# Patient Record
Sex: FEMALE | HISPANIC OR LATINO | Employment: FULL TIME | URBAN - METROPOLITAN AREA
[De-identification: names, ages, dates, MRNs, and addresses within clinical notes are randomized per-mention and may not be internally consistent; named-entity substitution may affect disease eponyms.]

---

## 2017-04-21 ENCOUNTER — OFFICE VISIT (OUTPATIENT)
Dept: MEDICAL GROUP | Facility: PHYSICIAN GROUP | Age: 27
End: 2017-04-21
Payer: COMMERCIAL

## 2017-04-21 ENCOUNTER — HOSPITAL ENCOUNTER (OUTPATIENT)
Facility: MEDICAL CENTER | Age: 27
End: 2017-04-21
Attending: NURSE PRACTITIONER
Payer: COMMERCIAL

## 2017-04-21 VITALS
TEMPERATURE: 98.6 F | RESPIRATION RATE: 14 BRPM | WEIGHT: 139 LBS | BODY MASS INDEX: 23.73 KG/M2 | SYSTOLIC BLOOD PRESSURE: 106 MMHG | DIASTOLIC BLOOD PRESSURE: 64 MMHG | HEART RATE: 70 BPM | OXYGEN SATURATION: 100 % | HEIGHT: 64 IN

## 2017-04-21 DIAGNOSIS — N89.8 VAGINAL ODOR: ICD-10-CM

## 2017-04-21 DIAGNOSIS — Z97.5 IUD (INTRAUTERINE DEVICE) IN PLACE: ICD-10-CM

## 2017-04-21 DIAGNOSIS — A74.9 CHLAMYDIA INFECTION: ICD-10-CM

## 2017-04-21 PROCEDURE — 87491 CHLMYD TRACH DNA AMP PROBE: CPT

## 2017-04-21 PROCEDURE — 87660 TRICHOMONAS VAGIN DIR PROBE: CPT

## 2017-04-21 PROCEDURE — 99214 OFFICE O/P EST MOD 30 MIN: CPT | Performed by: NURSE PRACTITIONER

## 2017-04-21 PROCEDURE — 87480 CANDIDA DNA DIR PROBE: CPT

## 2017-04-21 PROCEDURE — 87510 GARDNER VAG DNA DIR PROBE: CPT

## 2017-04-21 PROCEDURE — 88175 CYTOPATH C/V AUTO FLUID REDO: CPT

## 2017-04-21 PROCEDURE — 87591 N.GONORRHOEAE DNA AMP PROB: CPT

## 2017-04-21 RX ORDER — METRONIDAZOLE 500 MG/1
500 TABLET ORAL 3 TIMES DAILY
Qty: 30 TAB | Refills: 0 | Status: SHIPPED | OUTPATIENT
Start: 2017-04-21 | End: 2019-06-18

## 2017-04-21 ASSESSMENT — PATIENT HEALTH QUESTIONNAIRE - PHQ9: CLINICAL INTERPRETATION OF PHQ2 SCORE: 0

## 2017-04-21 NOTE — ASSESSMENT & PLAN NOTE
Patient states that she had a ParaGard IUD placed by Dr. Olmstead 3 years ago. She has an 8-year-old and a 3-year-old daughter. She is in a monogamous relationship at this time. States that she checks her strings every month or so. Denies abdominal pain,

## 2017-04-21 NOTE — ASSESSMENT & PLAN NOTE
Patient states that she has ongoing vaginal odor, especially after intercourse. She denies itching pain lesions fever or chills. Patient states that she had BV one year ago that was treated with Flagyl however she states that symptoms have never felt like they completely went away. She is due for a Pap smear.

## 2017-04-21 NOTE — PROGRESS NOTES
Chief Complaint   Patient presents with   • Establish Care   • Other     vaginal odor        Subjective:   Josefina REGAN is a 27 y.o. female here today to establish care for evaluation and management of:    IUD (intrauterine device) in place- paragard- 10/6/2014  Patient states that she had a ParaGard IUD placed by Dr. Olmstead 3 years ago. She has an 8-year-old and a 3-year-old daughter. She is in a monogamous relationship at this time. States that she checks her strings every month or so. Denies abdominal pain,    Vaginal odor  Patient states that she has ongoing vaginal odor, especially after intercourse. She denies itching pain lesions fever or chills. Patient states that she had BV one year ago that was treated with Flagyl however she states that symptoms have never felt like they completely went away. She is due for a Pap smear.       Current medicines (including changes today)  Current Outpatient Prescriptions   Medication Sig Dispense Refill   • metronidazole (FLAGYL) 500 MG Tab Take 1 Tab by mouth 3 times a day. 30 Tab 0   • metronidazole (FLAGYL) 500 MG Tab Take 1 Tab by mouth every 12 hours. (Patient not taking: Reported on 6/21/2016) 14 Tab 0   • metronidazole (FLAGYL) 500 MG TABS Take 1 Tab by mouth 3 times a day. (Patient not taking: Reported on 6/21/2016) 30 Tab 0   • fluconazole (DIFLUCAN) 100 MG TABS Take 1.5 Tabs by mouth every day. (Patient not taking: Reported on 6/21/2016) 1 Tab 0   • norgestimate-ethinyl estradiol (ORTHO-CYCLEN, 28,) 0.25-35 MG-MCG per tablet Take 1 Tab by mouth every day. (Patient not taking: Reported on 6/21/2016) 28 Tab 2   • Norethin Ace-Eth Estrad-FE (MINASTRIN 24 FE) 1-20 MG-MCG(24) CHEW Take 1 Tab by mouth every day. (Patient not taking: Reported on 6/21/2016) 28 Tab 11   • Prenatal Vit-Fe Fumarate-FA (PRENATAL VITAMINS) 28-0.8 MG TABS Take 1 Tab by mouth every day. (Patient not taking: Reported on 6/21/2016) 90 Tab 3   • norethindrone (MICRONOR) 0.35 MG  "tablet Take 1 Tab by mouth every day. (Patient not taking: Reported on 6/21/2016) 28 Tab 11   • ibuprofen (MOTRIN) 800 MG TABS Take 1 Tab by mouth every 8 hours as needed (Cramping). (Patient not taking: Reported on 6/21/2016) 60 Tab 1   • oxycodone-acetaminophen (PERCOCET) 5-325 MG TABS Take 1 Tab by mouth every four hours as needed ((Pain Scale 4-6); If acetaminophen ineffective). (Patient not taking: Reported on 6/21/2016) 15 Tab 0   • Prenat w/o A-FeCbGl-DSS-FA-DHA (CITRANATAL 90 DHA) 90-1 & 300 MG MISC Take 1 mg by mouth every day. GENERIC OKAY (Patient not taking: Reported on 6/21/2016) 100 Each 2     No current facility-administered medications for this visit.     She  has a past medical history of NEGATIVE HISTORY OF and Urinary tract infection, site not specified (2008).    ROS as stated in history of present illness.  No chest pain, no shortness of breath, no abdominal pain       Objective:     Blood pressure 106/64, pulse 70, temperature 37 °C (98.6 °F), resp. rate 14, height 1.626 m (5' 4\"), weight 63.05 kg (139 lb), last menstrual period 04/01/2017, SpO2 100 %, not currently breastfeeding. Body mass index is 23.85 kg/(m^2). stable.  Physical Exam:  Constitutional: Alert, no distress.  Skin: Warm, dry, good turgor,no cyanosis, no rashes in visible areas.  Eye: Equal, round and reactive, conjunctiva clear, lids normal.  Ears: No tenderness, no discharge.  External canals are without any significant edema or erythema..  Gross auditory acuity is intact.  Nose: symmetrical without tenderness, no discharge.  Mouth/Throat: lips without lesion.  Oropharynx clear.  .  Neck: Trachea midline, no masses, no obvious thyroid enlargement.. No cervical or supraclavicular lymphadenopathy. Range of motion within normal limits.  Neuro: Cranial nerves 2-12 grossly intact.  No sensory deficit.  Respiratory: Unlabored respiratory effort, lungs clear to auscultation, no wheezes, no ronchi.  Cardiovascular: Normal S1, S2, no " murmur, no edema.  Abdomen: Soft, non-tender, no masses, no guarding,  no hepatosplenomegaly.  GYN: Labia without lesions or swelling. Internal vaginal vault without lesions or irritation noted. White discharge without odor noted. Cervix without lesions or tenderness. No cervical motion tenderness. No uterine or ovarian pain with bimanual exam.   Psych: Alert and oriented x3, normal affect and mood and judgement.        Assessment and Plan:   The following treatment plan was discussed          1. Vaginal odor  This is a new problem to me. Chronic. Ongoing. Vaginal exam performed with swabs obtained including overdue Pap smear. We'll monitor and follow results.    - VAGINAL PATHOGENS DNA PANEL; Future  - CHLAMYDIA/GC PCR URINE OR SWAB; Future  - THINPREP RFLX HPV ASCUS W/CTNG; Future    3. IUD (intrauterine device) in place- paragard- 10/6/2014  IUD strings noted on vaginal exam.. Discussed with patient the need for Pap smears every 2-3 years if they are normal. Discussed any questions that patient had.      Followup: Return in about 1 year (around 4/21/2018), or if symptoms worsen or fail to improve, for Annual.

## 2017-04-21 NOTE — MR AVS SNAPSHOT
"        Josefina Brittany REGAN   2017 9:00 AM   Office Visit   MRN: [id number]    Department:  [facility]   Dept Phone:  [phone]    Description:  Female : 1990   Provider:  ZAKI Beavers           Reason for Visit     Establish Care     Other vaginal odor       Allergies as of 2017     Allergen Noted Reactions    Sulfa Drugs 2017         You were diagnosed with     Chlamydia infection   [661635]       Vaginal odor   [472744]       IUD (intrauterine device) in place   [518899]         Vital Signs     Blood Pressure Pulse Temperature Respirations Height Weight    106/64 mmHg 70 37 °C (98.6 °F) 14 1.626 m (5' 4\") 63.05 kg (139 lb)    Body Mass Index Oxygen Saturation Last Menstrual Period Breastfeeding? Smoking Status       23.85 kg/m2 100% 2017 No Never Smoker        Basic Information     Date Of Birth Sex Race Ethnicity Preferred Language    1990 Female  or   Origin (Bel[place]ian,Irish,Rwandan,Soy, etc) English      Problem List              ICD-10-CM Priority Class Noted - Resolved    IUD (intrauterine device) in place- paragard- 10/6/2014 Z97.5   10/28/2014 - Present    Bacterial vaginosis N76.0, B96.89   2015 - Present    Vaginal odor N89.8   2017 - Present      Health Maintenance        Date Due Completion Dates    PAP SMEAR 2019, 2014, 2013, 2011    IMM DTaP/Tdap/Td Vaccine (2 - Td) 2024            Current Immunizations     HPV Quadrivalent Vaccine (GARDASIL) 2012    Hepatitis B Vaccine Recombivax (Adol/Adult) 2016    Influenza Vaccine Quad Inj (Preserved) 10/31/2016    Tdap Vaccine 2014  2:30 PM      Below and/or attached are the medications your provider expects you to take. Review all of your home medications and newly ordered medications with your provider and/or pharmacist. Follow medication instructions as directed by your provider and/or pharmacist. " Please keep your medication list with you and share with your provider. Update the information when medications are discontinued, doses are changed, or new medications (including over-the-counter products) are added; and carry medication information at all times in the event of emergency situations     Allergies:  SULFA DRUGS - (reactions not documented)               Medications  Valid as of: April 21, 2017 -  9:42 AM    Generic Name Brand Name Tablet Size Instructions for use    Fluconazole (Tab) DIFLUCAN 100 MG Take 1.5 Tabs by mouth every day.        Ibuprofen (Tab) MOTRIN 800 MG Take 1 Tab by mouth every 8 hours as needed (Cramping).        MetroNIDAZOLE (Tab) FLAGYL 500 MG Take 1 Tab by mouth 3 times a day.        MetroNIDAZOLE (Tab) FLAGYL 500 MG Take 1 Tab by mouth every 12 hours.        MetroNIDAZOLE (Tab) FLAGYL 500 MG Take 1 Tab by mouth 3 times a day.        Norethin Ace-Eth Estrad-FE (Chew Tab) Norethin Ace-Eth Estrad-FE 1-20 MG-MCG(24) Take 1 Tab by mouth every day.        Norethindrone (Tab) MICRONOR 0.35 MG Take 1 Tab by mouth every day.        Norgestimate-Eth Estradiol (Tab) ORTHO-CYCLEN 0.25-35 MG-MCG Take 1 Tab by mouth every day.        Oxycodone-Acetaminophen (Tab) PERCOCET 5-325 MG Take 1 Tab by mouth every four hours as needed ((Pain Scale 4-6); If acetaminophen ineffective).        Prenat w/o A-FeCbGl-DSS-FA-DHA (Misc) CITRANATAL 90 DHA 90-1 & 300 MG Take 1 mg by mouth every day. GENERIC OKAY        Prenatal Vit-Fe Fumarate-FA (Tab) Prenatal Vitamins 28-0.8 MG Take 1 Tab by mouth every day.        .                 Medicines prescribed today were sent to:     [facility] [address] [place] [address] - [id number] [facility] [address]    Phone: [phone] Fax: [phone]    Open 24 Hours?: No      Medication refill instructions:       If your prescription bottle indicates you have medication refills left, it is not necessary to call your provider’s office.  Please contact your pharmacy and they will refill your medication.    If your prescription bottle indicates you do not have any refills left, you may request refills at any time through one of the following ways: The online [employer] system (except Urgent Care), by calling your provider’s office, or by asking your pharmacy to contact your provider’s office with a refill request. Medication refills are processed only during regular business hours and may not be available until the next business day. Your provider may request additional information or to have a follow-up visit with you prior to refilling your medication.   *Please Note: Medication refills are assigned a new Rx number when refilled electronically. Your pharmacy may indicate that no refills were authorized even though a new prescription for the same medication is available at the pharmacy. Please request the medicine by name with the pharmacy before contacting your provider for a refill.        Your To Do List     Future Labs/Procedures Complete By Expires    CHLAMYDIA/GC PCR URINE OR SWAB  As directed 4/22/2018    THINPREP RFLX HPV ASCUS W/CTNG  As directed 4/22/2018    VAGINAL PATHOGENS DNA PANEL  As directed 4/22/2018         [employer] Access Code: Activation code not generated  Current [employer] Status: Active

## 2017-04-22 LAB
C TRACH DNA GENITAL QL NAA+PROBE: NEGATIVE
C TRACH DNA SPEC QL NAA+PROBE: NEGATIVE
CANDIDA DNA VAG QL PROBE+SIG AMP: NEGATIVE
CYTOLOGY REG CYTOL: NORMAL
G VAGINALIS DNA VAG QL PROBE+SIG AMP: POSITIVE
N GONORRHOEA DNA GENITAL QL NAA+PROBE: NEGATIVE
N GONORRHOEA DNA SPEC QL NAA+PROBE: NEGATIVE
SPECIMEN SOURCE: NORMAL
SPECIMEN SOURCE: NORMAL
T VAGINALIS DNA VAG QL PROBE+SIG AMP: NEGATIVE

## 2017-04-24 RX ORDER — METRONIDAZOLE 500 MG/1
500 TABLET ORAL 2 TIMES DAILY
Qty: 14 TAB | Refills: 0 | Status: SHIPPED | OUTPATIENT
Start: 2017-04-24 | End: 2019-06-18

## 2017-04-25 ENCOUNTER — PATIENT MESSAGE (OUTPATIENT)
Dept: MEDICAL GROUP | Facility: PHYSICIAN GROUP | Age: 27
End: 2017-04-25

## 2019-05-09 ENCOUNTER — TELEPHONE (OUTPATIENT)
Dept: SCHEDULING | Facility: IMAGING CENTER | Age: 29
End: 2019-05-09

## 2019-06-18 ENCOUNTER — OFFICE VISIT (OUTPATIENT)
Dept: INTERNAL MEDICINE | Facility: MEDICAL CENTER | Age: 29
End: 2019-06-18
Payer: MEDICAID

## 2019-06-18 VITALS
BODY MASS INDEX: 25.33 KG/M2 | OXYGEN SATURATION: 96 % | TEMPERATURE: 99.4 F | WEIGHT: 152 LBS | SYSTOLIC BLOOD PRESSURE: 110 MMHG | HEIGHT: 65 IN | HEART RATE: 82 BPM | DIASTOLIC BLOOD PRESSURE: 64 MMHG

## 2019-06-18 DIAGNOSIS — Z76.89 ESTABLISHING CARE WITH NEW DOCTOR, ENCOUNTER FOR: ICD-10-CM

## 2019-06-18 DIAGNOSIS — Z11.4 SCREENING FOR HIV (HUMAN IMMUNODEFICIENCY VIRUS): ICD-10-CM

## 2019-06-18 DIAGNOSIS — Z00.00 ANNUAL PHYSICAL EXAM: ICD-10-CM

## 2019-06-18 DIAGNOSIS — E66.3 OVERWEIGHT (BMI 25.0-29.9): ICD-10-CM

## 2019-06-18 PROCEDURE — G0438 PPPS, INITIAL VISIT: HCPCS | Mod: GE | Performed by: INTERNAL MEDICINE

## 2019-06-18 NOTE — PROGRESS NOTES
New Patient to Establish    Reason to establish: New patient to establish    CC:   -Annual physical exam    HPI: Ms. Noriega is a 29 years old lady who presents to the clinic for the previously mentioned reason.  Past medical history remarkable for overweight.    Depression Screening    Little interest or pleasure in doing things?   No  Feeling down, depressed , or hopeless?  No     Trouble falling or staying asleep, or sleeping too much?   No  Feeling tired or having little energy?   No  Poor appetite or overeating?   No  Feeling bad about yourself - or that you are a failure or have let yourself or your family down?  No  Trouble concentrating on things, such as reading the newspaper or watching television?  No  Moving or speaking so slowly that other people could have noticed.  Or the opposite - being so fidgety or restless that you have been moving around a lot more than usual?   No  Thoughts that you would be better off dead, or of hurting yourself?   No  Patient Health Questionnaire Score:  0    If depressive symptoms identified deferred to follow up visit unless specifically addressed in assessment and plan.    Interpretation of PHQ-9 Total Score   Score Severity   1-4 No Depression   5-9 Mild Depression   10-14 Moderate Depression   15-19 Moderately Severe Depression   20-27 Severe Depression      Screening for Cognitive Impairment    Three Minute Recall (village, kitchen, baby)  3/3    Chase clock face with all 12 numbers and set the hands to show 10 past 10.       Cognitive concerns identified deferred for follow up unless specifically addressed in assessment and plan.    Fall Risk Assessment    Has the patient had two or more falls in the last year or any fall with injury in the last year?   No    Safety Assessment    Throw rugs on floor.   Yes  Handrails on all stairs.   No  Good lighting in all hallways.   Yes  Difficulty hearing.   No  Patient counseled about all safety risks that were  identified.    Functional Assessment ADLs    Are there any barriers preventing you from cooking for yourself or meeting nutritional needs?   .  no  Are there any barriers preventing you from driving safely or obtaining transportation?   .  no  Are there any barriers preventing you from using a telephone or calling for help?   .  no  Are there any barriers preventing you from shopping?   .  no  Are there any barriers preventing you from taking care of your own finances?   .  no  Are there any barriers preventing you from managing your medications?   .  no  Are there any barriers preventing you from showering, bathing or dressing yourself?  .  no  Are you currently engaging in any exercise or physical activity?   .   no but has plan to initiate physical exercise and diet moidification  What is your perception of your health?   .Very good      Health Maintenance Summary                IMM INFLUENZA Next Due 9/1/2019      Done 10/31/2016 Imm Admin: Influenza Vaccine Quad Inj (Preserved)    PAP SMEAR Next Due 4/21/2020      Done 4/21/2017 PATHOLOGY GYN SPECIMEN     Patient has more history with this topic...    IMM DTaP/Tdap/Td Vaccine Next Due 2/25/2024      Done 2/25/2014 Imm Admin: Tdap Vaccine          Patient Care Team:  Kameron Chiang M.D. as PCP - General (Internal Medicine)      Patient Active Problem List    Diagnosis Date Noted   • Overweight (BMI 25.0-29.9) 06/18/2019   • History of Bacterial vaginosis 06/16/2015   • IUD (intrauterine device) in place- paragard- 10/6/2014 10/28/2014       Past Medical History:   Diagnosis Date   • NEGATIVE HISTORY OF    • Urinary tract infection, site not specified 2008       No current outpatient prescriptions on file.     No current facility-administered medications for this visit.        Allergies as of 06/18/2019 - Reviewed 06/18/2019   Allergen Reaction Noted   • Sulfa drugs  04/21/2017       Social History     Social History   • Marital status: Single     Spouse  "name: N/A   • Number of children: N/A   • Years of education: N/A     Occupational History   • Not on file.     Social History Main Topics   • Smoking status: Never Smoker   • Smokeless tobacco: Never Used   • Alcohol use No   • Drug use: No   • Sexual activity: No     Other Topics Concern   • Not on file     Social History Narrative   • No narrative on file       Family History   Problem Relation Age of Onset   • Diabetes Mother    • Hypertension Mother    • No Known Problems Father        Past Surgical History:   Procedure Laterality Date   • MAMMOPLASTY AUGMENTATION     • SINUSOTOMIES         ROS: As per HPI. Additional pertinent systems as noted below.  Constitutional: Negative for chills and fever.   HENT: Negative for ear pain and sore throat.    Eyes: Negative for discharge and redness.   Respiratory: Negative for cough, hemoptysis, wheezing and stridor.    Cardiovascular: Negative for chest pain, palpitations and leg swelling.   Gastrointestinal: Negative for abdominal pain, constipation, diarrhea, heartburn, nausea and vomiting.   Genitourinary: Negative for dysuria, flank pain and hematuria.   Musculoskeletal: Negative for falls and myalgias.   Skin: Negative for itching and rash.   Neurological: Negative for dizziness, seizures, loss of consciousness and headaches.   Endo/Heme/Allergies: Negative for polydipsia. Does not bruise/bleed easily.   Psychiatric/Behavioral: Negative for substance abuse and suicidal ideas.       /64 (BP Location: Right arm, Patient Position: Sitting, BP Cuff Size: Adult)   Pulse 82   Temp 37.4 °C (99.4 °F) (Temporal)   Ht 1.638 m (5' 4.5\")   Wt 68.9 kg (152 lb)   SpO2 96%   BMI 25.69 kg/m²     Physical Exam  General:  Alert and oriented, No apparent distress.    Eyes: Pupils equal and reactive. No scleral icterus.    Throat: Clear no erythema or exudates noted.    Neck: Supple. No lymphadenopathy noted. Thyroid not enlarged.    Lungs: Clear to auscultation and " percussion bilaterally.    Cardiovascular: Regular rate and rhythm. No murmurs, rubs or gallops.    Abdomen:  Benign. No rebound or guarding noted.    Extremities: No clubbing, cyanosis, edema.    Skin: Clear. No rash or suspicious skin lesions noted.    Other:     Note: I have reviewed all pertinent labs and diagnostic tests associated with this visit with specific comments listed under the assessment and plan below    Assessment and Plan    1. Annual physical exam  -Patient denies having any complaints today  -Patient consider herself healthy  -Negative depression screening, PHQ 2 0  -Normal physical examination    2. Establishing care with new doctor, encounter for  -Patient has no primary care physician and would like to establish care with us    3.  Screening for HIV (human immunodeficiency virus)  -Patient currently not sexually active, last sexual encounter was more than a year ago  -Patient currently has IUD, patient had her IUD for 4 years  -Last Pap smear was 4/2017, was negative for HPV, she was treated for bacterial vaginosis at that time with metronidazole 500 mg twice daily for 7 days, patient was tested positive for chlamydia at that time and she was treated appropriately by her gynecologist (with her partner)  Plan  -HIV AG/AB COMBO ASSAY screening  -No need for Pap smear as her last Pap smear was 2017      Followup: Return in about 1 year (around 6/18/2020).    Risk Assessment (discuss potential complications a function of chronic problems): Risk assessment has been discussed with the patient    Complexity (discuss number of co-morbidities): Comorbidities have been discussed with the patient    Signed by: Kameron Chiang M.D.

## 2019-08-19 ENCOUNTER — OFFICE VISIT (OUTPATIENT)
Dept: URGENT CARE | Facility: PHYSICIAN GROUP | Age: 29
End: 2019-08-19

## 2019-08-19 ENCOUNTER — HOSPITAL ENCOUNTER (OUTPATIENT)
Facility: MEDICAL CENTER | Age: 29
End: 2019-08-19
Attending: FAMILY MEDICINE

## 2019-08-19 VITALS
HEIGHT: 64 IN | DIASTOLIC BLOOD PRESSURE: 60 MMHG | HEART RATE: 82 BPM | OXYGEN SATURATION: 99 % | TEMPERATURE: 98.8 F | BODY MASS INDEX: 23.9 KG/M2 | SYSTOLIC BLOOD PRESSURE: 110 MMHG | WEIGHT: 140 LBS

## 2019-08-19 DIAGNOSIS — N89.8 VAGINAL DISCHARGE: ICD-10-CM

## 2019-08-19 DIAGNOSIS — Z71.1 CONCERN ABOUT STD IN FEMALE WITHOUT DIAGNOSIS: ICD-10-CM

## 2019-08-19 LAB
APPEARANCE UR: CLEAR
BILIRUB UR STRIP-MCNC: NORMAL MG/DL
COLOR UR AUTO: NORMAL
GLUCOSE UR STRIP.AUTO-MCNC: NORMAL MG/DL
KETONES UR STRIP.AUTO-MCNC: 40 MG/DL
LEUKOCYTE ESTERASE UR QL STRIP.AUTO: NORMAL
NITRITE UR QL STRIP.AUTO: NORMAL
PH UR STRIP.AUTO: 6.5 [PH] (ref 5–8)
PROT UR QL STRIP: NORMAL MG/DL
RBC UR QL AUTO: NORMAL
SP GR UR STRIP.AUTO: 1.02
UROBILINOGEN UR STRIP-MCNC: 0.2 MG/DL

## 2019-08-19 PROCEDURE — 87660 TRICHOMONAS VAGIN DIR PROBE: CPT

## 2019-08-19 PROCEDURE — 87510 GARDNER VAG DNA DIR PROBE: CPT

## 2019-08-19 PROCEDURE — 99214 OFFICE O/P EST MOD 30 MIN: CPT | Performed by: FAMILY MEDICINE

## 2019-08-19 PROCEDURE — 87591 N.GONORRHOEAE DNA AMP PROB: CPT

## 2019-08-19 PROCEDURE — 87491 CHLMYD TRACH DNA AMP PROBE: CPT

## 2019-08-19 PROCEDURE — 81002 URINALYSIS NONAUTO W/O SCOPE: CPT | Performed by: FAMILY MEDICINE

## 2019-08-19 PROCEDURE — 87480 CANDIDA DNA DIR PROBE: CPT

## 2019-08-19 RX ORDER — M-VIT,TX,IRON,MINS/CALC/FOLIC 27MG-0.4MG
1 TABLET ORAL DAILY
COMMUNITY

## 2019-08-19 ASSESSMENT — ENCOUNTER SYMPTOMS
SHORTNESS OF BREATH: 0
VOMITING: 0
FEVER: 0

## 2019-08-19 NOTE — PROGRESS NOTES
"Subjective:     Josefina REGAN is a 29 y.o. female who presents for Vaginitis (x3days odor)    HPI  Pt presents for evaluation of a new problem   Vaginal odor for the past 3 days   Constant and stable, not worsening   Having watery discharge   No associated vaginal bleeding   Has not tried any treatments so far   Currently sexually active without condoms   Unsure if she has STD exposure, but would like testing   Has an IUD in place   LMP 8 days ago     Review of Systems   Constitutional: Negative for fever.   Respiratory: Negative for shortness of breath.    Cardiovascular: Negative for chest pain.   Gastrointestinal: Negative for vomiting.   Genitourinary: Negative for dysuria.   Skin: Negative for rash.     PMH:  has a past medical history of NEGATIVE HISTORY OF and Urinary tract infection, site not specified (2008).  MEDS:   Current Outpatient Medications:   •  therapeutic multivitamin-minerals (THERAGRAN-M) Tab, Take 1 Tab by mouth every day., Disp: , Rfl:   ALLERGIES:   Allergies   Allergen Reactions   • Sulfa Drugs      SURGHX:   Past Surgical History:   Procedure Laterality Date   • MAMMOPLASTY AUGMENTATION     • SINUSOTOMIES       SOCHX:  reports that she has never smoked. She has never used smokeless tobacco. She reports that she does not drink alcohol or use drugs.  FH: Family history was reviewed, not contributing to acute illness     Objective:   /60   Pulse 82   Temp 37.1 °C (98.8 °F) (Temporal)   Ht 1.626 m (5' 4\")   Wt 63.5 kg (140 lb)   SpO2 99%   BMI 24.03 kg/m²     Physical Exam   Constitutional: She is oriented to person, place, and time. She appears well-developed and well-nourished. No distress.   HENT:   Head: Normocephalic and atraumatic.   Abdominal: Soft. Bowel sounds are normal. She exhibits no distension and no mass. There is no rebound and no guarding.   +Mild suprapubic tenderness  No CVA tenderness    Neurological: She is alert and oriented to person, place, and " time. No sensory deficit.   Skin: Skin is warm and dry. She is not diaphoretic. No erythema.   Psychiatric: She has a normal mood and affect. Her behavior is normal. Judgment and thought content normal.       Assessment/Plan:   Assessment    1. Vaginal discharge  2. Concern about STD in female without diagnosis   Point-of-care urine within normal limits.  Will send vaginal path as well as STD panel.  We will follow-up with lab results.  - POCT Urinalysis  - VAGINAL PATHOGENS DNA PANEL; Future  - CHLAMYDIA/GC PCR URINE OR SWAB; Future  - HIV AG/AB COMBO ASSAY SCREENING; Future  - RPR (SYPHILIS); Future

## 2019-08-20 LAB
C TRACH DNA SPEC QL NAA+PROBE: NEGATIVE
CANDIDA DNA VAG QL PROBE+SIG AMP: NEGATIVE
G VAGINALIS DNA VAG QL PROBE+SIG AMP: POSITIVE
N GONORRHOEA DNA SPEC QL NAA+PROBE: NEGATIVE
SPECIMEN SOURCE: NORMAL
T VAGINALIS DNA VAG QL PROBE+SIG AMP: NEGATIVE

## 2019-08-21 ENCOUNTER — TELEPHONE (OUTPATIENT)
Dept: URGENT CARE | Facility: PHYSICIAN GROUP | Age: 29
End: 2019-08-21

## 2019-08-21 ENCOUNTER — PATIENT MESSAGE (OUTPATIENT)
Dept: MEDICAL GROUP | Facility: CLINIC | Age: 29
End: 2019-08-21

## 2019-08-21 DIAGNOSIS — N76.0 BACTERIAL VAGINOSIS: ICD-10-CM

## 2019-08-21 DIAGNOSIS — B96.89 BACTERIAL VAGINOSIS: ICD-10-CM

## 2019-08-21 RX ORDER — CLINDAMYCIN HYDROCHLORIDE 300 MG/1
300 CAPSULE ORAL 2 TIMES DAILY
Qty: 14 CAP | Refills: 0 | Status: SHIPPED | OUTPATIENT
Start: 2019-08-21 | End: 2019-08-28

## 2019-08-21 NOTE — TELEPHONE ENCOUNTER
Pt called stating she was expecting an abx for treatment/. [facility]    She can be reached at  [phone]

## 2020-10-08 ENCOUNTER — TELEPHONE (OUTPATIENT)
Dept: SCHEDULING | Facility: IMAGING CENTER | Age: 30
End: 2020-10-08

## 2020-10-15 ENCOUNTER — OFFICE VISIT (OUTPATIENT)
Dept: MEDICAL GROUP | Facility: MEDICAL CENTER | Age: 30
End: 2020-10-15
Payer: COMMERCIAL

## 2020-10-15 VITALS
WEIGHT: 158 LBS | HEART RATE: 81 BPM | RESPIRATION RATE: 16 BRPM | BODY MASS INDEX: 26.98 KG/M2 | DIASTOLIC BLOOD PRESSURE: 64 MMHG | OXYGEN SATURATION: 98 % | SYSTOLIC BLOOD PRESSURE: 110 MMHG | HEIGHT: 64 IN

## 2020-10-15 DIAGNOSIS — Z00.00 ROUTINE GENERAL MEDICAL EXAMINATION AT A HEALTH CARE FACILITY: ICD-10-CM

## 2020-10-15 DIAGNOSIS — L24.9 IRRITANT CONTACT DERMATITIS, UNSPECIFIED TRIGGER: ICD-10-CM

## 2020-10-15 PROBLEM — E66.3 OVERWEIGHT (BMI 25.0-29.9): Status: RESOLVED | Noted: 2019-06-18 | Resolved: 2020-10-15

## 2020-10-15 PROCEDURE — 99203 OFFICE O/P NEW LOW 30 MIN: CPT | Performed by: NURSE PRACTITIONER

## 2020-10-15 RX ORDER — TRIAMCINOLONE ACETONIDE 1 MG/G
1 CREAM TOPICAL 2 TIMES DAILY
Qty: 30 G | Refills: 1 | Status: SHIPPED | OUTPATIENT
Start: 2020-10-15 | End: 2022-05-19

## 2020-10-15 ASSESSMENT — PATIENT HEALTH QUESTIONNAIRE - PHQ9: CLINICAL INTERPRETATION OF PHQ2 SCORE: 0

## 2020-10-15 NOTE — PROGRESS NOTES
Subjective:      Josefina REGAN is a 30 y.o. female who presents with Establish Care        CC: Patient is here today to establish care as well as irritation of the skin in the right axilla area    HPI       1. Irritant contact dermatitis, unspecified trigger  Patient reports that about 2 months ago she irritated the skin in her right axilla area and started using over-the-counter products to improve it but despite this it has not changed in the last month.  She states there is some redness and pruritus.  There has been no lymph node enlargement or open areas of the skin.  She does continue to shave the areas.  She does not have anything similar on the left side.    2. Routine general medical examination at a health care facility  Patient states she feels generally healthy and admits she is due for 3-year follow-up Pap smear and has not done any routine blood work.  Past Medical History:   Diagnosis Date   • NEGATIVE HISTORY OF    • Urinary tract infection, site not specified 2008     Social History     Socioeconomic History   • Marital status: Single     Spouse name: Not on file   • Number of children: Not on file   • Years of education: Not on file   • Highest education level: Not on file   Occupational History   • Not on file   Social Needs   • Financial resource strain: Not on file   • Food insecurity     Worry: Not on file     Inability: Not on file   • Transportation needs     Medical: Not on file     Non-medical: Not on file   Tobacco Use   • Smoking status: Never Smoker   • Smokeless tobacco: Never Used   Substance and Sexual Activity   • Alcohol use: Yes     Comment: rare   • Drug use: No   • Sexual activity: Yes     Partners: Male     Birth control/protection: I.U.D.   Lifestyle   • Physical activity     Days per week: Not on file     Minutes per session: Not on file   • Stress: Not on file   Relationships   • Social connections     Talks on phone: Not on file     Gets together: Not on file      "Attends Latter-day service: Not on file     Active member of club or organization: Not on file     Attends meetings of clubs or organizations: Not on file     Relationship status: Not on file   • Intimate partner violence     Fear of current or ex partner: Not on file     Emotionally abused: Not on file     Physically abused: Not on file     Forced sexual activity: Not on file   Other Topics Concern   • Not on file   Social History Narrative   • Not on file     Current Outpatient Medications   Medication Sig Dispense Refill   • triamcinolone acetonide (KENALOG) 0.1 % Cream Apply 1 Application to affected area(s) 2 times a day. 30 g 1   • therapeutic multivitamin-minerals (THERAGRAN-M) Tab Take 1 Tab by mouth every day.       No current facility-administered medications for this visit.      Family History   Problem Relation Age of Onset   • Diabetes Mother    • Hypertension Mother    • No Known Problems Father          Review of Systems   Skin: Positive for itching and rash.   All other systems reviewed and are negative.         Objective:     /64 (BP Location: Left arm, Patient Position: Sitting, BP Cuff Size: Adult)   Pulse 81   Resp 16   Ht 1.626 m (5' 4\")   Wt 71.7 kg (158 lb)   SpO2 98%   BMI 27.12 kg/m²      Physical Exam  Vitals signs and nursing note reviewed.   Constitutional:       General: She is not in acute distress.     Appearance: She is well-developed. She is not diaphoretic.   HENT:      Head: Normocephalic and atraumatic.      Right Ear: External ear normal.      Left Ear: External ear normal.      Nose: Nose normal.   Eyes:      General:         Right eye: No discharge.         Left eye: No discharge.   Neck:      Musculoskeletal: Normal range of motion and neck supple.      Thyroid: No thyromegaly.   Cardiovascular:      Rate and Rhythm: Normal rate and regular rhythm.      Heart sounds: Normal heart sounds. No murmur. No friction rub. No gallop.    Pulmonary:      Effort: Pulmonary " effort is normal.      Breath sounds: Normal breath sounds. No wheezing or rales.   Musculoskeletal:         General: No tenderness.   Skin:     General: Skin is warm and dry.      Findings: Rash present.      Comments: Macular, erythematous rash in the right axilla area without ulcers or nodular areas beneath the skin.   Neurological:      Mental Status: She is alert and oriented to person, place, and time.      Deep Tendon Reflexes: Reflexes normal.   Psychiatric:         Behavior: Behavior normal.         Thought Content: Thought content normal.         Judgment: Judgment normal.                 Assessment/Plan:        1. Irritant contact dermatitis, unspecified trigger  Patient has possible irritant dermatitis and I advised her to stop all over-the-counter products and she admits that tea tree oil probably made it worse.  I will have her try some triamcinolone twice a day for 1 to 2 weeks.  I told her if it does not improve with this then I would refer her to dermatology.  - triamcinolone acetonide (KENALOG) 0.1 % Cream; Apply 1 Application to affected area(s) 2 times a day.  Dispense: 30 g; Refill: 1    2. Routine general medical examination at a health care facility  Patient would like to get some routine blood work and she will follow-up if necessary pending results.  I also advised her that she should make an appointment for her 3-year Pap smear and it appears she has an appointment with  next week for this..  - Comp Metabolic Panel; Future  - Lipid Profile; Future

## 2020-10-22 ENCOUNTER — OFFICE VISIT (OUTPATIENT)
Dept: MEDICAL GROUP | Facility: MEDICAL CENTER | Age: 30
End: 2020-10-22
Payer: COMMERCIAL

## 2020-10-22 ENCOUNTER — HOSPITAL ENCOUNTER (OUTPATIENT)
Facility: MEDICAL CENTER | Age: 30
End: 2020-10-22
Attending: NURSE PRACTITIONER
Payer: COMMERCIAL

## 2020-10-22 VITALS
HEIGHT: 64 IN | WEIGHT: 159.61 LBS | HEART RATE: 77 BPM | OXYGEN SATURATION: 97 % | SYSTOLIC BLOOD PRESSURE: 106 MMHG | DIASTOLIC BLOOD PRESSURE: 60 MMHG | BODY MASS INDEX: 27.25 KG/M2 | TEMPERATURE: 98.3 F

## 2020-10-22 DIAGNOSIS — Z12.4 PAP SMEAR FOR CERVICAL CANCER SCREENING: ICD-10-CM

## 2020-10-22 DIAGNOSIS — Z97.5 PRESENCE OF INTRAUTERINE CONTRACEPTIVE DEVICE: ICD-10-CM

## 2020-10-22 DIAGNOSIS — N63.0 BREAST LUMP: ICD-10-CM

## 2020-10-22 PROCEDURE — 87624 HPV HI-RISK TYP POOLED RSLT: CPT

## 2020-10-22 PROCEDURE — 87491 CHLMYD TRACH DNA AMP PROBE: CPT

## 2020-10-22 PROCEDURE — 88175 CYTOPATH C/V AUTO FLUID REDO: CPT

## 2020-10-22 PROCEDURE — 87591 N.GONORRHOEAE DNA AMP PROB: CPT

## 2020-10-22 PROCEDURE — 99395 PREV VISIT EST AGE 18-39: CPT | Performed by: NURSE PRACTITIONER

## 2020-10-22 NOTE — PROGRESS NOTES
CC:  Pap/Well Woman Exam    History of present illness:    Josefina REGAN is 30 y.o. female presenting today for well woman exam with gynecological exam and Pap smear.   She reports her periods are .  She is currently using  IUD as birth control. Implant She is currently not exercising on a routine basis.     Last mammogram: n/a  Last Dexa scan: n/a  Last colonoscopy: n/a        Hx of Fem Touch procedure from Dr. Jackson (Plastic Surgeon)    Past Gynecological History  Patient's last menstrual period was 3 weeks ago  BC or HRT: Copper IUD  Postmenopausal?:  Denies postmenopausal bleeding?: n/a  Menarche: 10  Menses: regular  Sexually active: yes  Sexually transmitted infections: yes  Pap: denies any history of abnormal pap   Symptoms of overactive bladder: no  Symptoms of stress incontinence: no  Symptoms of bowel incontinence: no  Feeling of vaginal bulge:no  History of sexual abuse:no  Fibroids?: no  Ovarian cysts?: no          Past Medical History:   Diagnosis Date   • NEGATIVE HISTORY OF    • Urinary tract infection, site not specified        Past Surgical History:   Procedure Laterality Date   • MAMMOPLASTY AUGMENTATION     • SINUSOTOMIES         Outpatient Encounter Medications as of 10/22/2020   Medication Sig Dispense Refill   • triamcinolone acetonide (KENALOG) 0.1 % Cream Apply 1 Application to affected area(s) 2 times a day. 30 g 1   • therapeutic multivitamin-minerals (THERAGRAN-M) Tab Take 1 Tab by mouth every day.       No facility-administered encounter medications on file as of 10/22/2020.        Patient Active Problem List    Diagnosis Date Noted   • IUD (intrauterine device) in place- paragard- 10/6/2014 10/28/2014       .  Social History     Socioeconomic History   • Marital status: Single     Spouse name: Not on file   • Number of children: Not on file   • Years of education: Not on file   • Highest education level: Not on file   Occupational History   • Not on file   Social  "Needs   • Financial resource strain: Not on file   • Food insecurity     Worry: Not on file     Inability: Not on file   • Transportation needs     Medical: Not on file     Non-medical: Not on file   Tobacco Use   • Smoking status: Never Smoker   • Smokeless tobacco: Never Used   Substance and Sexual Activity   • Alcohol use: Yes     Comment: rare   • Drug use: No   • Sexual activity: Yes     Partners: Male     Birth control/protection: I.U.D.   Lifestyle   • Physical activity     Days per week: Not on file     Minutes per session: Not on file   • Stress: Not on file   Relationships   • Social connections     Talks on phone: Not on file     Gets together: Not on file     Attends Restoration service: Not on file     Active member of club or organization: Not on file     Attends meetings of clubs or organizations: Not on file     Relationship status: Not on file   • Intimate partner violence     Fear of current or ex partner: Not on file     Emotionally abused: Not on file     Physically abused: Not on file     Forced sexual activity: Not on file   Other Topics Concern   • Not on file   Social History Narrative   • Not on file       Family History   Problem Relation Age of Onset   • Diabetes Mother    • Hypertension Mother    • No Known Problems Father          ROS: Denies Weight loss, fatigue, chest pain, SOB, bowel or bladder changes.  Denies musculoskeletal, neurological, or psychiatric problems.  Denies dysuria, dyspareunia or abnormal vaginal discharge.      States in a safe relationship :yes      /60 (BP Location: Right arm, Patient Position: Sitting, BP Cuff Size: Adult)   Pulse 77   Temp 36.8 °C (98.3 °F) (Temporal)   Ht 1.626 m (5' 4\")   Wt 72.4 kg (159 lb 9.8 oz)   LMP 10/01/2020   SpO2 97%   Breastfeeding No Comment: Copper IUD placed in 2014  BMI 27.40 kg/m²     GEN:  Appears well and in no apparent distress   NECK:  Supple without adenopathy or thyromegaly  LUNGS:  Clear to auscultation.  " Normal breath sounds.  CV:  RRR without murmers or S3 or S4.  Peripheral pulses intact.  BREAST:  Bilateral breast implants. Right breast with small marble sized lump at 11'0'clock position eb-areolar area.   ABD:  Soft, non-tender, non-distended, normal bowel sounds.  No hepatosplenomegaly.  :  Normal external female genitalia.  Vaginal canal clear.  Cervix appears normal. Mild protrusion of cervix, +IUD strings. Bimanual exam:  No CMT, normal size uterus without masses or tenderness; no adnexal masses or tenderness.      Assessment and plan      1. Pap smear for cervical cancer screening  - THINPREP PAP W/HPV AND CTNG; Future    2. Breast lump  - MA-DIAGNOSTIC MAMMO BILAT W/IMPLANTS W/ANAIS W/CAD; Future    3. IUD (intrauterine device) in place- paragard- 10/6/2014        Education:  Have encouraged the patient to have a heart healthy diet, rich in fruits and vegetables. Limit alcohol use, avoid tobacco products. Participate in physical activity at least 3-5 times weekly. Take a multivitamin daily. Continue with recommended screenings and immunizations.       A chaperone was offered to the patient during today's exam. Patient declined chaperone.    I have placed the below orders and discussed them with an approved delegating provider.  The MA is performing the below orders under the direction of Dr. Bullock.      Pap Smear  Specimen collected today will await results from the lab.        Daxa POOL

## 2020-10-23 DIAGNOSIS — Z12.4 PAP SMEAR FOR CERVICAL CANCER SCREENING: ICD-10-CM

## 2020-10-26 LAB
C TRACH DNA GENITAL QL NAA+PROBE: NEGATIVE
CYTOLOGY REG CYTOL: NORMAL
HPV HR 12 DNA CVX QL NAA+PROBE: NEGATIVE
HPV16 DNA SPEC QL NAA+PROBE: NEGATIVE
HPV18 DNA SPEC QL NAA+PROBE: NEGATIVE
N GONORRHOEA DNA GENITAL QL NAA+PROBE: NEGATIVE
SPECIMEN SOURCE: NORMAL
SPECIMEN SOURCE: NORMAL

## 2020-11-02 ENCOUNTER — HOSPITAL ENCOUNTER (OUTPATIENT)
Dept: RADIOLOGY | Facility: MEDICAL CENTER | Age: 30
End: 2020-11-02
Attending: NURSE PRACTITIONER
Payer: COMMERCIAL

## 2020-11-02 DIAGNOSIS — N63.0 BREAST LUMP: ICD-10-CM

## 2020-11-02 PROCEDURE — G0279 TOMOSYNTHESIS, MAMMO: HCPCS

## 2020-11-02 PROCEDURE — 76642 ULTRASOUND BREAST LIMITED: CPT | Mod: RT

## 2022-01-27 ENCOUNTER — HOSPITAL ENCOUNTER (OUTPATIENT)
Facility: MEDICAL CENTER | Age: 32
End: 2022-01-27
Attending: NURSE PRACTITIONER
Payer: COMMERCIAL

## 2022-01-27 ENCOUNTER — OFFICE VISIT (OUTPATIENT)
Dept: URGENT CARE | Facility: PHYSICIAN GROUP | Age: 32
End: 2022-01-27
Payer: COMMERCIAL

## 2022-01-27 VITALS
WEIGHT: 140 LBS | OXYGEN SATURATION: 99 % | HEART RATE: 62 BPM | BODY MASS INDEX: 23.9 KG/M2 | DIASTOLIC BLOOD PRESSURE: 64 MMHG | HEIGHT: 64 IN | TEMPERATURE: 98.8 F | SYSTOLIC BLOOD PRESSURE: 110 MMHG | RESPIRATION RATE: 12 BRPM

## 2022-01-27 DIAGNOSIS — N76.0 BACTERIAL VAGINOSIS: ICD-10-CM

## 2022-01-27 DIAGNOSIS — B96.89 BACTERIAL VAGINOSIS: ICD-10-CM

## 2022-01-27 DIAGNOSIS — N30.01 ACUTE CYSTITIS WITH HEMATURIA: ICD-10-CM

## 2022-01-27 DIAGNOSIS — N76.0 ACUTE VAGINITIS: ICD-10-CM

## 2022-01-27 LAB
APPEARANCE UR: NORMAL
BILIRUB UR STRIP-MCNC: NORMAL MG/DL
COLOR UR AUTO: YELLOW
GLUCOSE UR STRIP.AUTO-MCNC: NORMAL MG/DL
INT CON NEG: NORMAL
INT CON POS: NORMAL
KETONES UR STRIP.AUTO-MCNC: NORMAL MG/DL
LEUKOCYTE ESTERASE UR QL STRIP.AUTO: NORMAL
NITRITE UR QL STRIP.AUTO: NORMAL
PH UR STRIP.AUTO: 7.5 [PH] (ref 5–8)
POC URINE PREGNANCY TEST: NORMAL
PROT UR QL STRIP: 100 MG/DL
RBC UR QL AUTO: NORMAL
SP GR UR STRIP.AUTO: 1.02
UROBILINOGEN UR STRIP-MCNC: 0.2 MG/DL

## 2022-01-27 PROCEDURE — 87660 TRICHOMONAS VAGIN DIR PROBE: CPT

## 2022-01-27 PROCEDURE — 87077 CULTURE AEROBIC IDENTIFY: CPT

## 2022-01-27 PROCEDURE — 87480 CANDIDA DNA DIR PROBE: CPT

## 2022-01-27 PROCEDURE — 81002 URINALYSIS NONAUTO W/O SCOPE: CPT | Performed by: NURSE PRACTITIONER

## 2022-01-27 PROCEDURE — 99214 OFFICE O/P EST MOD 30 MIN: CPT | Performed by: NURSE PRACTITIONER

## 2022-01-27 PROCEDURE — 87186 SC STD MICRODIL/AGAR DIL: CPT

## 2022-01-27 PROCEDURE — 87086 URINE CULTURE/COLONY COUNT: CPT

## 2022-01-27 PROCEDURE — 81025 URINE PREGNANCY TEST: CPT | Performed by: NURSE PRACTITIONER

## 2022-01-27 PROCEDURE — 87510 GARDNER VAG DNA DIR PROBE: CPT

## 2022-01-27 RX ORDER — PHENAZOPYRIDINE HYDROCHLORIDE 200 MG/1
200 TABLET, FILM COATED ORAL 3 TIMES DAILY
Qty: 6 TABLET | Refills: 0 | Status: SHIPPED | OUTPATIENT
Start: 2022-01-27 | End: 2022-01-29

## 2022-01-27 RX ORDER — NITROFURANTOIN 25; 75 MG/1; MG/1
100 CAPSULE ORAL EVERY 12 HOURS
Qty: 10 CAPSULE | Refills: 0 | Status: SHIPPED | OUTPATIENT
Start: 2022-01-27 | End: 2022-02-01

## 2022-01-27 ASSESSMENT — ENCOUNTER SYMPTOMS
NAUSEA: 0
BLOOD IN STOOL: 0
VOMITING: 0
FLANK PAIN: 1
FEVER: 0
ABDOMINAL PAIN: 1

## 2022-01-27 NOTE — PROGRESS NOTES
"  Subjective:     Josefina REGAN is a 31 y.o. female who presents for Dysuria (x 4 days/ abd pain/ flank pain )      Dysuria started 3 days ago. Right lower back pain this morning, 7/10. \"Bearable, uncomfortable\".  LMP started yesterday. No hematuria. IUD x 6 years. Had had vaginal irritation, itching just prior to her menstrual cycle starting. No vaginal discharge. Abdominal cramping and bloating, lower right abdomen. Denies pregnancy. Possibly from something she ate yesterday. Has had an increase in bowel movements since yesterday,  6 x. No URI symptoms. No hx of abdominal sx.     Dysuria   This is a new problem. The current episode started in the past 7 days. The problem has been gradually worsening. The pain is at a severity of 7/10. The pain is moderate. She is sexually active. There is no history of pyelonephritis. Associated symptoms include flank pain. Pertinent negatives include no hematuria, nausea or vomiting.       Past Medical History:   Diagnosis Date   • NEGATIVE HISTORY OF    • Urinary tract infection, site not specified 2008       Past Surgical History:   Procedure Laterality Date   • MAMMOPLASTY AUGMENTATION     • SINUSOTOMIES         Social History     Socioeconomic History   • Marital status: Single     Spouse name: Not on file   • Number of children: Not on file   • Years of education: Not on file   • Highest education level: Not on file   Occupational History   • Not on file   Tobacco Use   • Smoking status: Never Smoker   • Smokeless tobacco: Never Used   Vaping Use   • Vaping Use: Never used   Substance and Sexual Activity   • Alcohol use: Yes     Comment: rare   • Drug use: No   • Sexual activity: Yes     Partners: Male     Birth control/protection: I.U.D.   Other Topics Concern   • Not on file   Social History Narrative   • Not on file     Social Determinants of Health     Financial Resource Strain:    • Difficulty of Paying Living Expenses: Not on file   Food Insecurity:    • " "Worried About Running Out of Food in the Last Year: Not on file   • Ran Out of Food in the Last Year: Not on file   Transportation Needs:    • Lack of Transportation (Medical): Not on file   • Lack of Transportation (Non-Medical): Not on file   Physical Activity:    • Days of Exercise per Week: Not on file   • Minutes of Exercise per Session: Not on file   Stress:    • Feeling of Stress : Not on file   Social Connections:    • Frequency of Communication with Friends and Family: Not on file   • Frequency of Social Gatherings with Friends and Family: Not on file   • Attends Quaker Services: Not on file   • Active Member of Clubs or Organizations: Not on file   • Attends Club or Organization Meetings: Not on file   • Marital Status: Not on file   Intimate Partner Violence:    • Fear of Current or Ex-Partner: Not on file   • Emotionally Abused: Not on file   • Physically Abused: Not on file   • Sexually Abused: Not on file   Housing Stability:    • Unable to Pay for Housing in the Last Year: Not on file   • Number of Places Lived in the Last Year: Not on file   • Unstable Housing in the Last Year: Not on file        Family History   Problem Relation Age of Onset   • Diabetes Mother    • Hypertension Mother    • No Known Problems Father         Allergies   Allergen Reactions   • Sulfa Drugs        Review of Systems   Constitutional: Negative for fever.   Gastrointestinal: Positive for abdominal pain. Negative for blood in stool, nausea and vomiting.   Genitourinary: Positive for dysuria and flank pain. Negative for hematuria.   All other systems reviewed and are negative.       Objective:   /64 (BP Location: Left arm, Patient Position: Sitting, BP Cuff Size: Adult)   Pulse 62   Temp 37.1 °C (98.8 °F) (Temporal)   Resp 12   Ht 1.626 m (5' 4\")   Wt 63.5 kg (140 lb)   LMP 01/26/2022 (Approximate)   SpO2 99%   Breastfeeding No   BMI 24.03 kg/m²     Physical Exam  Vitals reviewed.   Constitutional:       " General: She is not in acute distress.     Appearance: She is well-developed.   HENT:      Head: Normocephalic and atraumatic.   Eyes:      Conjunctiva/sclera: Conjunctivae normal.   Cardiovascular:      Rate and Rhythm: Normal rate.   Pulmonary:      Effort: Pulmonary effort is normal. No respiratory distress.   Abdominal:      General: Abdomen is flat. Bowel sounds are normal. There is no distension.      Palpations: Abdomen is soft.      Tenderness: There is abdominal tenderness in the right lower quadrant, suprapubic area and left lower quadrant. There is no right CVA tenderness, left CVA tenderness, guarding or rebound.      Comments: Generalized mild lower abdominal TTP, greater on the right side. TTP to right lower lumbar.    Skin:     General: Skin is warm and dry.      Findings: No rash.   Neurological:      General: No focal deficit present.      Mental Status: She is alert and oriented to person, place, and time.      GCS: GCS eye subscore is 4. GCS verbal subscore is 5. GCS motor subscore is 6.   Psychiatric:         Mood and Affect: Mood normal.         Speech: Speech normal.         Behavior: Behavior normal.         Thought Content: Thought content normal.         Judgment: Judgment normal.         Assessment/Plan:   1. Acute cystitis with hematuria  - POCT Urinalysis  - URINE CULTURE(NEW); Future  - POCT Pregnancy  - nitrofurantoin (MACROBID) 100 MG Cap; Take 1 Capsule by mouth every 12 hours for 5 days.  Dispense: 10 Capsule; Refill: 0  - phenazopyridine (PYRIDIUM) 200 MG Tab; Take 1 Tablet by mouth 3 times a day for 2 days.  Dispense: 6 Tablet; Refill: 0    2. Acute vaginitis  - VAGINAL PATHOGENS DNA PANEL; Future    Results for orders placed or performed in visit on 01/27/22   POCT Urinalysis   Result Value Ref Range    POC Color Yellow Negative    POC Appearance Cloudy Negative    POC Leukocyte Esterase Small Negative    POC Nitrites NEG Negative    POC Urobiligen 0.2 Negative (0.2) mg/dL    POC  Protein 100 Negative mg/dL    POC Urine PH 7.5 5.0 - 8.0    POC Blood Large Negative    POC Specific Gravity 1.020 <1.005 - >1.030    POC Ketones NEG Negative mg/dL    POC Bilirubin NEG Negative mg/dL    POC Glucose NEG Negative mg/dL   -Oral Hydration: Drink plenty of water.  -Take antibiotic as prescribed.  -Follow up with PCP.    Follow up urgently for new or persistent abdominal pain, flank pain, difficulty with urination, fevers, vomiting, weakness, tachycardia, or any other concerns. Given ER precautions for increased or persistent abdominal pain, fevers, CVA tenderness.     Differential diagnosis, natural history, supportive care, and indications for immediate follow-up discussed.

## 2022-01-27 NOTE — PATIENT INSTRUCTIONS
-Oral Hydration: Drink plenty of water.  -Take antibiotic as prescribed.  -Follow up with PCP.    Follow up urgently for new or persistent abdominal pain, flank pain, difficulty with urination, fevers, vomiting, weakness, tachycardia, or any other concerns      Urinary Tract Infection, Adult    A urinary tract infection (UTI) is an infection of any part of the urinary tract. The urinary tract includes the kidneys, ureters, bladder, and urethra. These organs make, store, and get rid of urine in the body.  Your health care provider may use other names to describe the infection. An upper UTI affects the ureters and kidneys (pyelonephritis). A lower UTI affects the bladder (cystitis) and urethra (urethritis).  What are the causes?  Most urinary tract infections are caused by bacteria in your genital area, around the entrance to your urinary tract (urethra). These bacteria grow and cause inflammation of your urinary tract.  What increases the risk?  You are more likely to develop this condition if:  · You have a urinary catheter that stays in place (indwelling).  · You are not able to control when you urinate or have a bowel movement (you have incontinence).  · You are female and you:  ? Use a spermicide or diaphragm for birth control.  ? Have low estrogen levels.  ? Are pregnant.  · You have certain genes that increase your risk (genetics).  · You are sexually active.  · You take antibiotic medicines.  · You have a condition that causes your flow of urine to slow down, such as:  ? An enlarged prostate, if you are male.  ? Blockage in your urethra (stricture).  ? A kidney stone.  ? A nerve condition that affects your bladder control (neurogenic bladder).  ? Not getting enough to drink, or not urinating often.  · You have certain medical conditions, such as:  ? Diabetes.  ? A weak disease-fighting system (immunesystem).  ? Sickle cell disease.  ? Gout.  ? Spinal cord injury.  What are the signs or symptoms?  Symptoms of  this condition include:  · Needing to urinate right away (urgently).  · Frequent urination or passing small amounts of urine frequently.  · Pain or burning with urination.  · Blood in the urine.  · Urine that smells bad or unusual.  · Trouble urinating.  · Cloudy urine.  · Vaginal discharge, if you are female.  · Pain in the abdomen or the lower back.  You may also have:  · Vomiting or a decreased appetite.  · Confusion.  · Irritability or tiredness.  · A fever.  · Diarrhea.  The first symptom in older adults may be confusion. In some cases, they may not have any symptoms until the infection has worsened.  How is this diagnosed?  This condition is diagnosed based on your medical history and a physical exam. You may also have other tests, including:  · Urine tests.  · Blood tests.  · Tests for sexually transmitted infections (STIs).  If you have had more than one UTI, a cystoscopy or imaging studies may be done to determine the cause of the infections.  How is this treated?  Treatment for this condition includes:  · Antibiotic medicine.  · Over-the-counter medicines to treat discomfort.  · Drinking enough water to stay hydrated.  If you have frequent infections or have other conditions such as a kidney stone, you may need to see a health care provider who specializes in the urinary tract (urologist).  In rare cases, urinary tract infections can cause sepsis. Sepsis is a life-threatening condition that occurs when the body responds to an infection. Sepsis is treated in the hospital with IV antibiotics, fluids, and other medicines.  Follow these instructions at home:    Medicines  · Take over-the-counter and prescription medicines only as told by your health care provider.  · If you were prescribed an antibiotic medicine, take it as told by your health care provider. Do not stop using the antibiotic even if you start to feel better.  General instructions  · Make sure you:  ? Empty your bladder often and completely. Do  not hold urine for long periods of time.  ? Empty your bladder after sex.  ? Wipe from front to back after a bowel movement if you are female. Use each tissue one time when you wipe.  · Drink enough fluid to keep your urine pale yellow.  · Keep all follow-up visits as told by your health care provider. This is important.  Contact a health care provider if:  · Your symptoms do not get better after 1-2 days.  · Your symptoms go away and then return.  Get help right away if you have:  · Severe pain in your back or your lower abdomen.  · A fever.  · Nausea or vomiting.  Summary  · A urinary tract infection (UTI) is an infection of any part of the urinary tract, which includes the kidneys, ureters, bladder, and urethra.  · Most urinary tract infections are caused by bacteria in your genital area, around the entrance to your urinary tract (urethra).  · Treatment for this condition often includes antibiotic medicines.  · If you were prescribed an antibiotic medicine, take it as told by your health care provider. Do not stop using the antibiotic even if you start to feel better.  · Keep all follow-up visits as told by your health care provider. This is important.  This information is not intended to replace advice given to you by your health care provider. Make sure you discuss any questions you have with your health care provider.  Document Released: 09/27/2006 Document Revised: 12/05/2019 Document Reviewed: 06/27/2019  [employer] Patient Education © 2020 [employer]      Vaginitis  Vaginitis is a condition in which the vaginal tissue swells and becomes red (inflamed). This condition is most often caused by a change in the normal balance of bacteria and yeast that live in the vagina. This change causes an overgrowth of certain bacteria or yeast, which causes the inflammation. There are different types of vaginitis, but the most common types are:  · Bacterial vaginosis.  · Yeast infection (candidiasis).  · Trichomoniasis  vaginitis. This is a sexually transmitted disease (STD).  · Viral vaginitis.  · Atrophic vaginitis.  · Allergic vaginitis.  What are the causes?  The cause of this condition depends on the type of vaginitis. It can be caused by:  · Bacteria (bacterial vaginosis).  · Yeast, which is a fungus (yeast infection).  · A parasite (trichomoniasis vaginitis).  · A virus (viral vaginitis).  · Low hormone levels (atrophic vaginitis). Low hormone levels can occur during pregnancy, breastfeeding, or after menopause.  · Irritants, such as bubble baths, scented tampons, and feminine sprays (allergic vaginitis).  Other factors can change the normal balance of the yeast and bacteria that live in the vagina. These include:  · Antibiotic medicines.  · Poor hygiene.  · Diaphragms, vaginal sponges, spermicides, birth control pills, and intrauterine devices (IUD).  · Sex.  · Infection.  · Uncontrolled diabetes.  · A weakened defense (immune) system.  What increases the risk?  This condition is more likely to develop in women who:  · Smoke.  · Use vaginal douches, scented tampons, or scented sanitary pads.  · Wear tight-fitting pants.  · Wear thong underwear.  · Use oral birth control pills or an IUD.  · Have sex without a condom.  · Have multiple sex partners.  · Have an STD.  · Frequently use the spermicide nonoxynol-9.  · Eat lots of foods high in sugar.  · Have uncontrolled diabetes.  · Have low estrogen levels.  · Have a weakened immune system from an immune disorder or medical treatment.  · Are pregnant or breastfeeding.  What are the signs or symptoms?  Symptoms vary depending on the cause of the vaginitis. Common symptoms include:  · Abnormal vaginal discharge.  ? The discharge is white, gray, or yellow with bacterial vaginosis.  ? The discharge is thick, white, and cheesy with a yeast infection.  ? The discharge is frothy and yellow or greenish with trichomoniasis.  · A bad vaginal smell. The smell is fishy with bacterial  vaginosis.  · Vaginal itching, pain, or swelling.  · Sex that is painful.  · Pain or burning when urinating.  Sometimes there are no symptoms.  How is this diagnosed?  This condition is diagnosed based on your symptoms and medical history. A physical exam, including a pelvic exam, will also be done. You may also have other tests, including:  · Tests to determine the pH level (acidity or alkalinity) of your vagina.  · A whiff test, to assess the odor that results when a sample of your vaginal discharge is mixed with a potassium hydroxide solution.  · Tests of vaginal fluid. A sample will be examined under a microscope.  How is this treated?  Treatment varies depending on the type of vaginitis you have. Your treatment may include:  · Antibiotic creams or pills to treat bacterial vaginosis and trichomoniasis.  · Antifungal medicines, such as vaginal creams or suppositories, to treat a yeast infection.  · Medicine to ease discomfort if you have viral vaginitis. Your sexual partner should also be treated.  · Estrogen delivered in a cream, pill, suppository, or vaginal ring to treat atrophic vaginitis. If vaginal dryness occurs, lubricants and moisturizing creams may help. You may need to avoid scented soaps, sprays, or douches.  · Stopping use of a product that is causing allergic vaginitis. Then using a vaginal cream to treat the symptoms.  Follow these instructions at home:  Lifestyle  · Keep your genital area clean and dry. Avoid soap, and only rinse the area with water.  · Do not douche or use tampons until your health care provider says it is okay to do so. Use sanitary pads, if needed.  · Do not have sex until your health care provider approves. When you can return to sex, practice safe sex and use condoms.  · Wipe from front to back. This avoids the spread of bacteria from the rectum to the vagina.  General instructions  · Take over-the-counter and prescription medicines only as told by your health care  provider.  · If you were prescribed an antibiotic medicine, take or use it as told by your health care provider. Do not stop taking or using the antibiotic even if you start to feel better.  · Keep all follow-up visits as told by your health care provider. This is important.  How is this prevented?  · Use mild, non-scented products. Do not use things that can irritate the vagina, such as fabric softeners. Avoid the following products if they are scented:  ? Feminine sprays.  ? Detergents.  ? Tampons.  ? Feminine hygiene products.  ? Soaps or bubble baths.  · Let air reach your genital area.  ? Wear cotton underwear to reduce moisture buildup.  ? Avoid wearing underwear while you sleep.  ? Avoid wearing tight pants and underwear or nylons without a cotton panel.  ? Avoid wearing thong underwear.  · Take off any wet clothing, such as bathing suits, as soon as possible.  · Practice safe sex and use condoms.  Contact a health care provider if:  · You have abdominal pain.  · You have a fever.  · You have symptoms that last for more than 2-3 days.  Get help right away if:  · You have a fever and your symptoms suddenly get worse.  Summary  · Vaginitis is a condition in which the vaginal tissue becomes inflamed.This condition is most often caused by a change in the normal balance of bacteria and yeast that live in the vagina.  · Treatment varies depending on the type of vaginitis you have.  · Do not douche, use tampons , or have sex until your health care provider approves. When you can return to sex, practice safe sex and use condoms.  This information is not intended to replace advice given to you by your health care provider. Make sure you discuss any questions you have with your health care provider.  Document Released: 10/14/2008 Document Revised: 11/30/2018 Document Reviewed: 01/23/2018  [email or web address] Patient Education © 2020 [employer]

## 2022-01-28 LAB
CANDIDA DNA VAG QL PROBE+SIG AMP: NEGATIVE
G VAGINALIS DNA VAG QL PROBE+SIG AMP: POSITIVE
T VAGINALIS DNA VAG QL PROBE+SIG AMP: NEGATIVE

## 2022-01-28 RX ORDER — METRONIDAZOLE 500 MG/1
500 TABLET ORAL 2 TIMES DAILY
Qty: 14 TABLET | Refills: 0 | Status: SHIPPED | OUTPATIENT
Start: 2022-01-28 | End: 2022-02-04

## 2022-01-30 LAB
BACTERIA UR CULT: ABNORMAL
BACTERIA UR CULT: ABNORMAL
SIGNIFICANT IND 70042: ABNORMAL
SITE SITE: ABNORMAL
SOURCE SOURCE: ABNORMAL

## 2022-05-16 ENCOUNTER — OFFICE VISIT (OUTPATIENT)
Dept: URGENT CARE | Facility: PHYSICIAN GROUP | Age: 32
End: 2022-05-16
Payer: COMMERCIAL

## 2022-05-16 ENCOUNTER — HOSPITAL ENCOUNTER (OUTPATIENT)
Facility: MEDICAL CENTER | Age: 32
End: 2022-05-16
Attending: NURSE PRACTITIONER
Payer: COMMERCIAL

## 2022-05-16 VITALS
WEIGHT: 150 LBS | TEMPERATURE: 97.3 F | HEIGHT: 64 IN | RESPIRATION RATE: 16 BRPM | SYSTOLIC BLOOD PRESSURE: 112 MMHG | DIASTOLIC BLOOD PRESSURE: 70 MMHG | OXYGEN SATURATION: 100 % | HEART RATE: 85 BPM | BODY MASS INDEX: 25.61 KG/M2

## 2022-05-16 DIAGNOSIS — N76.0 ACUTE VAGINITIS: ICD-10-CM

## 2022-05-16 DIAGNOSIS — Z11.3 SCREEN FOR STD (SEXUALLY TRANSMITTED DISEASE): ICD-10-CM

## 2022-05-16 DIAGNOSIS — K12.1 ORAL ULCER: ICD-10-CM

## 2022-05-16 DIAGNOSIS — K12.2 ORAL CELLULITIS: ICD-10-CM

## 2022-05-16 PROCEDURE — 87480 CANDIDA DNA DIR PROBE: CPT

## 2022-05-16 PROCEDURE — 99214 OFFICE O/P EST MOD 30 MIN: CPT | Performed by: NURSE PRACTITIONER

## 2022-05-16 PROCEDURE — 87591 N.GONORRHOEAE DNA AMP PROB: CPT

## 2022-05-16 PROCEDURE — 87491 CHLMYD TRACH DNA AMP PROBE: CPT

## 2022-05-16 PROCEDURE — 87510 GARDNER VAG DNA DIR PROBE: CPT

## 2022-05-16 PROCEDURE — 87660 TRICHOMONAS VAGIN DIR PROBE: CPT

## 2022-05-16 RX ORDER — DEXAMETHASONE 0.5 MG/5ML
1 SOLUTION ORAL EVERY 6 HOURS
Qty: 240 ML | Refills: 0 | Status: SHIPPED | OUTPATIENT
Start: 2022-05-16 | End: 2022-06-13

## 2022-05-16 RX ORDER — CEPHALEXIN 500 MG/1
500 CAPSULE ORAL 4 TIMES DAILY
Qty: 20 CAPSULE | Refills: 0 | Status: SHIPPED | OUTPATIENT
Start: 2022-05-16 | End: 2022-05-21

## 2022-05-16 NOTE — PROGRESS NOTES
Subjective:     Josefina REGAN is a 32 y.o. female who presents for Mouth Lesions       Other  This is a new problem. The problem has been gradually worsening.   Vaginitis  The patient's primary symptoms include vaginal discharge. This is a new problem. The problem has been gradually worsening. The vaginal discharge was yellow.     2 weeks ago, patient started to develop a canker sore at her right inner lower lip.  Accidentally bit it yesterday while she was eating.  Has redness and swelling surrounding the ulcer.  Saw her dentist right before the ulcer occurred and tried to contact her dentist for advice and was advised to be seen in urgent care.  Reports history of canker sores which usually resolves in about 2 hours and never lasting this long or being this severe.    About 2 weeks ago, patient also started to experience yellowish vaginal discharge.  Concerned for BV which she has had in the past.  Low suspicion for STD.  Reports monogamous relationship with male partner.    Unique test result dated 1/27/2022 reviewed: Vaginal pathogen swab positive for bacterial vaginosis    Patient was screened prior to rooming and denied COVID-19 diagnosis or contact with a person who has been diagnosed or is suspected to have COVID-19. During this visit, appropriate PPE was worn, hand hygiene was performed, and the patient and any visitors were masked.     PMH:  has a past medical history of NEGATIVE HISTORY OF and Urinary tract infection, site not specified (2008).    MEDS:   Current Outpatient Medications:   •  dexamethasone (DECADRON) 0.5 MG/5ML solution, Take 10 mL by mouth every 6 hours. Swish for 2 minutes then spit., Disp: 240 mL, Rfl: 0  •  cephALEXin (KEFLEX) 500 MG Cap, Take 1 Capsule by mouth in the morning and 1 Capsule at noon and 1 Capsule in the evening and 1 Capsule before bedtime. Do all this for 5 days., Disp: 20 Capsule, Rfl: 0  •  therapeutic multivitamin-minerals (THERAGRAN-M) Tab, Take 1 Tab by  "mouth every day., Disp: , Rfl:   •  metroNIDAZOLE (FLAGYL) 500 MG Tab, Take 1 Tablet by mouth 2 times a day for 7 days., Disp: 14 Tablet, Rfl: 0  •  triamcinolone acetonide (KENALOG) 0.1 % Cream, Apply 1 Application to affected area(s) 2 times a day. (Patient not taking: Reported on 1/27/2022), Disp: 30 g, Rfl: 1    ALLERGIES:   Allergies   Allergen Reactions   • Sulfa Drugs        SURGHX:   Past Surgical History:   Procedure Laterality Date   • MAMMOPLASTY AUGMENTATION     • SINUSOTOMIES       SOCHX:  reports that she has never smoked. She has never used smokeless tobacco. She reports current alcohol use. She reports that she does not use drugs.     FH: Reviewed with patient, not pertinent to this visit.    Review of Systems   Constitutional: Negative.    HENT:        Oral ulcer/sore   Genitourinary: Positive for vaginal discharge.   All other systems reviewed and are negative.    Additional details per HPI.      Objective:     /70   Pulse 85   Temp 36.3 °C (97.3 °F) (Temporal)   Resp 16   Ht 1.626 m (5' 4\")   Wt 68 kg (150 lb)   SpO2 100%   BMI 25.75 kg/m²     Physical Exam  Vitals reviewed.   Constitutional:       General: She is not in acute distress.     Appearance: She is well-developed. She is not ill-appearing or toxic-appearing.   HENT:      Mouth/Throat:      Mouth: Mucous membranes are moist. Oral lesions (Small, white, at inner lower lip, surrounding erythema, swelling/induration) present.      Pharynx: Oropharynx is clear.   Eyes:      General: Vision grossly intact.      Extraocular Movements: Extraocular movements intact.   Cardiovascular:      Rate and Rhythm: Normal rate.   Pulmonary:      Effort: Pulmonary effort is normal. No respiratory distress.   Musculoskeletal:         General: No deformity. Normal range of motion.      Cervical back: Normal range of motion.   Skin:     General: Skin is warm and dry.      Coloration: Skin is not pale.   Neurological:      Mental Status: She is " alert and oriented to person, place, and time.      Sensory: No sensory deficit.      Motor: No weakness.   Psychiatric:         Behavior: Behavior normal. Behavior is cooperative.       Assessment/Plan:     1. Oral ulcer  - dexamethasone (DECADRON) 0.5 MG/5ML solution; Take 10 mL by mouth every 6 hours. Swish for 2 minutes then spit.  Dispense: 240 mL; Refill: 0    2. Oral cellulitis  - cephALEXin (KEFLEX) 500 MG Cap; Take 1 Capsule by mouth in the morning and 1 Capsule at noon and 1 Capsule in the evening and 1 Capsule before bedtime. Do all this for 5 days.  Dispense: 20 Capsule; Refill: 0    3. Acute vaginitis  - VAGINAL PATHOGENS DNA PANEL; Future    4. Screen for STD (sexually transmitted disease)  - Chlamydia/GC, PCR (Urine); Future    Rx as above sent electronically. Swabs pending. Will contact.    Differential diagnosis, natural history, supportive care, over-the-counter symptom management per 's instructions, close monitoring, and indications for immediate follow-up discussed.     All questions answered. Patient agrees with the plan of care.    Discharge summary provided via [facility].

## 2022-05-17 DIAGNOSIS — N76.0 ACUTE VAGINITIS: ICD-10-CM

## 2022-05-17 DIAGNOSIS — Z11.3 SCREEN FOR STD (SEXUALLY TRANSMITTED DISEASE): ICD-10-CM

## 2022-05-17 LAB
AMBIGUOUS DTTM AMBI4: NORMAL
C TRACH DNA SPEC QL NAA+PROBE: NEGATIVE
CANDIDA DNA VAG QL PROBE+SIG AMP: NEGATIVE
G VAGINALIS DNA VAG QL PROBE+SIG AMP: POSITIVE
N GONORRHOEA DNA SPEC QL NAA+PROBE: NEGATIVE
SPECIMEN SOURCE: NORMAL
T VAGINALIS DNA VAG QL PROBE+SIG AMP: NEGATIVE

## 2022-05-18 ENCOUNTER — PATIENT MESSAGE (OUTPATIENT)
Dept: URGENT CARE | Facility: PHYSICIAN GROUP | Age: 32
End: 2022-05-18
Payer: COMMERCIAL

## 2022-05-18 DIAGNOSIS — B96.89 BV (BACTERIAL VAGINOSIS): ICD-10-CM

## 2022-05-18 DIAGNOSIS — N76.0 BV (BACTERIAL VAGINOSIS): ICD-10-CM

## 2022-05-18 RX ORDER — METRONIDAZOLE 500 MG/1
500 TABLET ORAL 2 TIMES DAILY
Qty: 14 TABLET | Refills: 0 | Status: SHIPPED | OUTPATIENT
Start: 2022-05-18 | End: 2022-05-25

## 2022-05-18 RX ORDER — CLINDAMYCIN HYDROCHLORIDE 300 MG/1
300 CAPSULE ORAL 2 TIMES DAILY
Qty: 14 CAPSULE | Refills: 0 | Status: SHIPPED | OUTPATIENT
Start: 2022-05-18 | End: 2022-05-18

## 2022-05-19 ASSESSMENT — ENCOUNTER SYMPTOMS
VAGINITIS: 1
CONSTITUTIONAL NEGATIVE: 1

## 2022-05-19 ASSESSMENT — VISUAL ACUITY: OU: 1

## 2022-05-23 ENCOUNTER — TELEPHONE (OUTPATIENT)
Dept: SCHEDULING | Facility: IMAGING CENTER | Age: 32
End: 2022-05-23
Payer: COMMERCIAL

## 2022-06-10 SDOH — ECONOMIC STABILITY: HOUSING INSECURITY

## 2022-06-10 SDOH — ECONOMIC STABILITY: FOOD INSECURITY: WITHIN THE PAST 12 MONTHS, THE FOOD YOU BOUGHT JUST DIDN'T LAST AND YOU DIDN'T HAVE MONEY TO GET MORE.: NEVER TRUE

## 2022-06-10 SDOH — HEALTH STABILITY: MENTAL HEALTH
STRESS IS WHEN SOMEONE FEELS TENSE, NERVOUS, ANXIOUS, OR CAN'T SLEEP AT NIGHT BECAUSE THEIR MIND IS TROUBLED. HOW STRESSED ARE YOU?: NOT AT ALL

## 2022-06-10 SDOH — ECONOMIC STABILITY: TRANSPORTATION INSECURITY
IN THE PAST 12 MONTHS, HAS THE LACK OF TRANSPORTATION KEPT YOU FROM MEDICAL APPOINTMENTS OR FROM GETTING MEDICATIONS?: NO

## 2022-06-10 SDOH — ECONOMIC STABILITY: INCOME INSECURITY: IN THE LAST 12 MONTHS, WAS THERE A TIME WHEN YOU WERE NOT ABLE TO PAY THE MORTGAGE OR RENT ON TIME?: NO

## 2022-06-10 SDOH — ECONOMIC STABILITY: HOUSING INSECURITY
IN THE LAST 12 MONTHS, WAS THERE A TIME WHEN YOU DID NOT HAVE A STEADY PLACE TO SLEEP OR SLEPT IN A SHELTER (INCLUDING NOW)?: NO

## 2022-06-10 SDOH — ECONOMIC STABILITY: TRANSPORTATION INSECURITY
IN THE PAST 12 MONTHS, HAS LACK OF TRANSPORTATION KEPT YOU FROM MEETINGS, WORK, OR FROM GETTING THINGS NEEDED FOR DAILY LIVING?: NO

## 2022-06-10 SDOH — HEALTH STABILITY: PHYSICAL HEALTH: ON AVERAGE, HOW MANY DAYS PER WEEK DO YOU ENGAGE IN MODERATE TO STRENUOUS EXERCISE (LIKE A BRISK WALK)?: 2 DAYS

## 2022-06-10 SDOH — ECONOMIC STABILITY: FOOD INSECURITY: WITHIN THE PAST 12 MONTHS, YOU WORRIED THAT YOUR FOOD WOULD RUN OUT BEFORE YOU GOT MONEY TO BUY MORE.: NEVER TRUE

## 2022-06-10 SDOH — HEALTH STABILITY: PHYSICAL HEALTH: ON AVERAGE, HOW MANY MINUTES DO YOU ENGAGE IN EXERCISE AT THIS LEVEL?: 20 MIN

## 2022-06-10 SDOH — ECONOMIC STABILITY: INCOME INSECURITY: HOW HARD IS IT FOR YOU TO PAY FOR THE VERY BASICS LIKE FOOD, HOUSING, MEDICAL CARE, AND HEATING?: NOT HARD AT ALL

## 2022-06-10 SDOH — ECONOMIC STABILITY: TRANSPORTATION INSECURITY
IN THE PAST 12 MONTHS, HAS LACK OF RELIABLE TRANSPORTATION KEPT YOU FROM MEDICAL APPOINTMENTS, MEETINGS, WORK OR FROM GETTING THINGS NEEDED FOR DAILY LIVING?: NO

## 2022-06-10 ASSESSMENT — SOCIAL DETERMINANTS OF HEALTH (SDOH)
HOW OFTEN DO YOU ATTENT MEETINGS OF THE CLUB OR ORGANIZATION YOU BELONG TO?: NEVER
IN A TYPICAL WEEK, HOW MANY TIMES DO YOU TALK ON THE PHONE WITH FAMILY, FRIENDS, OR NEIGHBORS?: TWICE A WEEK
HOW OFTEN DO YOU HAVE SIX OR MORE DRINKS ON ONE OCCASION: NEVER
HOW OFTEN DO YOU HAVE A DRINK CONTAINING ALCOHOL: NEVER
HOW OFTEN DO YOU ATTENT MEETINGS OF THE CLUB OR ORGANIZATION YOU BELONG TO?: NEVER
HOW MANY DRINKS CONTAINING ALCOHOL DO YOU HAVE ON A TYPICAL DAY WHEN YOU ARE DRINKING: PATIENT DOES NOT DRINK
DO YOU BELONG TO ANY CLUBS OR ORGANIZATIONS SUCH AS CHURCH GROUPS UNIONS, FRATERNAL OR ATHLETIC GROUPS, OR SCHOOL GROUPS?: NO
HOW OFTEN DO YOU ATTEND CHURCH OR RELIGIOUS SERVICES?: NEVER
HOW HARD IS IT FOR YOU TO PAY FOR THE VERY BASICS LIKE FOOD, HOUSING, MEDICAL CARE, AND HEATING?: NOT HARD AT ALL
HOW OFTEN DO YOU GET TOGETHER WITH FRIENDS OR RELATIVES?: ONCE A WEEK
IN A TYPICAL WEEK, HOW MANY TIMES DO YOU TALK ON THE PHONE WITH FAMILY, FRIENDS, OR NEIGHBORS?: TWICE A WEEK
ARE YOU MARRIED, WIDOWED, DIVORCED, SEPARATED, NEVER MARRIED, OR LIVING WITH A PARTNER?: NEVER MARRIED
HOW OFTEN DO YOU ATTEND CHURCH OR RELIGIOUS SERVICES?: NEVER
WITHIN THE PAST 12 MONTHS, YOU WORRIED THAT YOUR FOOD WOULD RUN OUT BEFORE YOU GOT THE MONEY TO BUY MORE: NEVER TRUE
DO YOU BELONG TO ANY CLUBS OR ORGANIZATIONS SUCH AS CHURCH GROUPS UNIONS, FRATERNAL OR ATHLETIC GROUPS, OR SCHOOL GROUPS?: NO
HOW OFTEN DO YOU GET TOGETHER WITH FRIENDS OR RELATIVES?: ONCE A WEEK
ARE YOU MARRIED, WIDOWED, DIVORCED, SEPARATED, NEVER MARRIED, OR LIVING WITH A PARTNER?: NEVER MARRIED

## 2022-06-10 ASSESSMENT — LIFESTYLE VARIABLES
HOW MANY STANDARD DRINKS CONTAINING ALCOHOL DO YOU HAVE ON A TYPICAL DAY: PATIENT DOES NOT DRINK
SKIP TO QUESTIONS 9-10: 1
HOW OFTEN DO YOU HAVE SIX OR MORE DRINKS ON ONE OCCASION: NEVER
HOW OFTEN DO YOU HAVE A DRINK CONTAINING ALCOHOL: NEVER
AUDIT-C TOTAL SCORE: 0

## 2022-06-13 ENCOUNTER — OFFICE VISIT (OUTPATIENT)
Dept: MEDICAL GROUP | Facility: PHYSICIAN GROUP | Age: 32
End: 2022-06-13
Payer: COMMERCIAL

## 2022-06-13 VITALS
SYSTOLIC BLOOD PRESSURE: 110 MMHG | TEMPERATURE: 98.5 F | WEIGHT: 154.6 LBS | RESPIRATION RATE: 16 BRPM | DIASTOLIC BLOOD PRESSURE: 72 MMHG | HEIGHT: 64 IN | HEART RATE: 60 BPM | BODY MASS INDEX: 26.4 KG/M2 | OXYGEN SATURATION: 100 %

## 2022-06-13 DIAGNOSIS — N92.0 MENORRHAGIA WITH REGULAR CYCLE: ICD-10-CM

## 2022-06-13 DIAGNOSIS — R53.83 OTHER FATIGUE: ICD-10-CM

## 2022-06-13 DIAGNOSIS — Z00.00 WELLNESS EXAMINATION: ICD-10-CM

## 2022-06-13 PROCEDURE — 99214 OFFICE O/P EST MOD 30 MIN: CPT | Performed by: FAMILY MEDICINE

## 2022-06-13 ASSESSMENT — PATIENT HEALTH QUESTIONNAIRE - PHQ9
SUM OF ALL RESPONSES TO PHQ QUESTIONS 1-9: 2
5. POOR APPETITE OR OVEREATING: 0 - NOT AT ALL
CLINICAL INTERPRETATION OF PHQ2 SCORE: 1

## 2022-06-13 NOTE — PROGRESS NOTES
"Subjective:     CC:   Chief Complaint   Patient presents with   • Establish Care       HPI:   Josefina presents today to establish care.  Patient does have monthly menstrual cycles she bleeds for 6 to 7 days 2 days are heavy.  Patient does have a nonhormonal IUD in.  Patient has noticed that she is, feeling tired wondering if she is anemic.  Patient would like to get wellness labs done.    Past Medical History:   Diagnosis Date   • NEGATIVE HISTORY OF    • Urinary tract infection, site not specified 2008       Social History     Tobacco Use   • Smoking status: Never Smoker   • Smokeless tobacco: Never Used   Vaping Use   • Vaping Use: Never used   Substance Use Topics   • Alcohol use: Not Currently     Comment: rare   • Drug use: No       Current Outpatient Medications Ordered in Epic   Medication Sig Dispense Refill   • therapeutic multivitamin-minerals (THERAGRAN-M) Tab Take 1 Tab by mouth every day.       No current [facility]-ordered facility-administered medications on file.       Allergies:  Sulfa drugs    Health Maintenance: Completed    ROS:  Gen: no fevers/chills, no changes in weight  Eyes: no changes in vision  ENT: no sore throat, no hearing loss, no bloody nose  Pulm: no sob, no cough  CV: no chest pain, no palpitations  GI: no nausea/vomiting, no diarrhea  : no dysuria  Neuro: no headaches, no numbness/tingling  Heme/Lymph: no easy bruising    Objective:     Exam:  /72 (BP Location: Left arm, Patient Position: Sitting, BP Cuff Size: Adult)   Pulse 60   Temp 36.9 °C (98.5 °F) (Temporal)   Resp 16   Ht 1.626 m (5' 4\")   Wt 70.1 kg (154 lb 9.6 oz)   LMP 06/03/2022   SpO2 100%   BMI 26.54 kg/m²  Body mass index is 26.54 kg/m².    Gen: Alert and oriented, No apparent distress.  Skin: Warm and dry.  No obvious lesions.  Eyes: Sclera wnl Pupils normal in size  ENT: TMs intact mouth negative redness or exudates Mallampati score is class I  Neck: Neck is supple without lymphadenopathy.  Lungs: Normal " effort, CTA bilaterally, no wheezes, rhonchi, or rales  CV: Regular rate and rhythm. No murmurs, rubs, or gallops.  ABD: Soft non-tender no organomegaly  Musculoskeletal: Normal gait. No extremity cyanosis, clubbing, or edema.  Neuro: Oriented to person, place and time  Psych: Mood is wnl       Labs: Patient given a listing of all the [facility] labs for her fasting lab work    Assessment & Plan:     32 y.o. female with the following -     1. Menorrhagia with regular cycle  I would recommend getting a CBC along with anemia check which patient was very agreeable with this is a chronic problem  2. Other fatigue  I also recommend checking her thyroid this is a chronic problem  3. Wellness examination       Return in about 3 weeks (around 7/4/2022).    Please note that this dictation was created using voice recognition software. I have made every reasonable attempt to correct obvious errors, but I expect that there are errors of grammar and possibly content that I did not discover before finalizing the note.

## 2022-07-22 ENCOUNTER — HOSPITAL ENCOUNTER (OUTPATIENT)
Facility: MEDICAL CENTER | Age: 32
End: 2022-07-22
Attending: FAMILY MEDICINE
Payer: COMMERCIAL

## 2022-07-22 ENCOUNTER — OFFICE VISIT (OUTPATIENT)
Dept: URGENT CARE | Facility: CLINIC | Age: 32
End: 2022-07-22
Payer: COMMERCIAL

## 2022-07-22 VITALS
TEMPERATURE: 98.9 F | DIASTOLIC BLOOD PRESSURE: 62 MMHG | OXYGEN SATURATION: 98 % | RESPIRATION RATE: 14 BRPM | HEART RATE: 91 BPM | WEIGHT: 151.6 LBS | HEIGHT: 64 IN | BODY MASS INDEX: 25.88 KG/M2 | SYSTOLIC BLOOD PRESSURE: 106 MMHG

## 2022-07-22 DIAGNOSIS — N30.00 ACUTE CYSTITIS WITHOUT HEMATURIA: ICD-10-CM

## 2022-07-22 LAB
APPEARANCE UR: NORMAL
BILIRUB UR STRIP-MCNC: NEGATIVE MG/DL
COLOR UR AUTO: NORMAL
GLUCOSE UR STRIP.AUTO-MCNC: NEGATIVE MG/DL
INT CON NEG: NORMAL
INT CON POS: NORMAL
KETONES UR STRIP.AUTO-MCNC: 15 MG/DL
LEUKOCYTE ESTERASE UR QL STRIP.AUTO: NORMAL
NITRITE UR QL STRIP.AUTO: NEGATIVE
PH UR STRIP.AUTO: 7 [PH] (ref 5–8)
POC URINE PREGNANCY TEST: NEGATIVE
PROT UR QL STRIP: NORMAL MG/DL
RBC UR QL AUTO: NORMAL
SP GR UR STRIP.AUTO: 1.02
UROBILINOGEN UR STRIP-MCNC: 0.2 MG/DL

## 2022-07-22 PROCEDURE — 87591 N.GONORRHOEAE DNA AMP PROB: CPT

## 2022-07-22 PROCEDURE — 87491 CHLMYD TRACH DNA AMP PROBE: CPT

## 2022-07-22 PROCEDURE — 87086 URINE CULTURE/COLONY COUNT: CPT

## 2022-07-22 PROCEDURE — 87660 TRICHOMONAS VAGIN DIR PROBE: CPT

## 2022-07-22 PROCEDURE — 81002 URINALYSIS NONAUTO W/O SCOPE: CPT | Performed by: FAMILY MEDICINE

## 2022-07-22 PROCEDURE — 81025 URINE PREGNANCY TEST: CPT | Performed by: FAMILY MEDICINE

## 2022-07-22 PROCEDURE — 87510 GARDNER VAG DNA DIR PROBE: CPT

## 2022-07-22 PROCEDURE — 87480 CANDIDA DNA DIR PROBE: CPT

## 2022-07-22 PROCEDURE — 87186 SC STD MICRODIL/AGAR DIL: CPT

## 2022-07-22 PROCEDURE — 87077 CULTURE AEROBIC IDENTIFY: CPT

## 2022-07-22 PROCEDURE — 99213 OFFICE O/P EST LOW 20 MIN: CPT | Performed by: FAMILY MEDICINE

## 2022-07-22 RX ORDER — NITROFURANTOIN 25; 75 MG/1; MG/1
100 CAPSULE ORAL 2 TIMES DAILY
Qty: 10 CAPSULE | Refills: 0 | Status: SHIPPED | OUTPATIENT
Start: 2022-07-22 | End: 2022-07-27

## 2022-07-22 NOTE — PROGRESS NOTES
Subjective:      CC:  presents with Dysuria            Dysuria   This is a new problem. The current episode started in the past 4 days. The problem occurs every urination. The problem has been unchanged. The quality of the pain is described as burning. There has been no fever. Pt is  sexually active.  + vaginal d/c.       There is no history of pyelonephritis. Associated symptoms include frequency and urgency. Pertinent negatives include no chills, discharge, flank pain, nausea or vomiting. Pt has tried nothing for the symptoms. There is no history of recurrent UTIs.         LMP:  7/21    Social History     Socioeconomic History   • Marital status: Single     Spouse name: Not on file   • Number of children: Not on file   • Years of education: Not on file   • Highest education level: Some college, no degree   Occupational History   • Not on file   Tobacco Use   • Smoking status: Never Smoker   • Smokeless tobacco: Never Used   Vaping Use   • Vaping Use: Never used   Substance and Sexual Activity   • Alcohol use: Not Currently     Comment: rare   • Drug use: No   • Sexual activity: Yes     Partners: Male     Birth control/protection: I.U.D.   Other Topics Concern   • Not on file   Social History Narrative   • Not on file     Social Determinants of Health     Financial Resource Strain: Low Risk    • Difficulty of Paying Living Expenses: Not hard at all   Food Insecurity: No Food Insecurity   • Worried About Running Out of Food in the Last Year: Never true   • Ran Out of Food in the Last Year: Never true   Transportation Needs: No Transportation Needs   • Lack of Transportation (Medical): No   • Lack of Transportation (Non-Medical): No   Physical Activity: Insufficiently Active   • Days of Exercise per Week: 2 days   • Minutes of Exercise per Session: 20 min   Stress: No Stress Concern Present   • Feeling of Stress : Not at all   Social Connections: Socially Isolated   • Frequency of Communication with Friends and  "Family: Twice a week   • Frequency of Social Gatherings with Friends and Family: Once a week   • Attends Mormon Services: Never   • Active Member of Clubs or Organizations: No   • Attends Club or Organization Meetings: Never   • Marital Status: Never    Intimate Partner Violence: Not on file   Housing Stability: Unknown   • Unable to Pay for Housing in the Last Year: No   • Number of Places Lived in the Last Year: Not on file   • Unstable Housing in the Last Year: No         Family History   Problem Relation Age of Onset   • Diabetes Mother    • Hypertension Mother    • Cervical Cancer Mother    • No Known Problems Father    • Alcohol abuse Paternal Grandmother          Allergies   Allergen Reactions   • Sulfa Drugs            Current Outpatient Medications on File Prior to Visit   Medication Sig Dispense Refill   • therapeutic multivitamin-minerals (THERAGRAN-M) Tab Take 1 Tab by mouth every day.       No current facility-administered medications on file prior to visit.       Review of Systems   Constitutional: Negative for chills.   Respiratory: Negative for shortness of breath.    Cardiovascular: Negative for chest pain.   Gastrointestinal: Negative for nausea, vomiting and abdominal pain.   Genitourinary: Positive for dysuria, urgency and frequency. Negative for flank pain.   Skin: Negative for rash.   Neurological: Negative for dizziness and headaches.   All other systems reviewed and are negative.         Objective:      /62 (BP Location: Left arm, Patient Position: Sitting, BP Cuff Size: Adult)   Pulse 91   Temp 37.2 °C (98.9 °F) (Temporal)   Resp 14   Ht 1.626 m (5' 4\")   Wt 68.8 kg (151 lb 9.6 oz)   SpO2 98%       Physical Exam   Constitutional: pt is oriented to person, place, and time. Pt appears well-developed and well-nourished. No distress.   HENT:   Head: Normocephalic and atraumatic.   Mouth/Throat: Mucous membranes are normal.   Eyes: Conjunctivae and EOM are normal. Pupils are " equal, round, and reactive to light. Right eye exhibits no discharge. Left eye exhibits no discharge. No scleral icterus.   Neck: Normal range of motion. Neck supple.   Cardiovascular: Normal rate, regular rhythm, normal heart sounds and intact distal pulses.    No murmur heard.  Pulmonary/Chest: Effort normal and breath sounds normal. No respiratory distress. Pt has no wheezes,  rales.   Abdominal: Bowel sounds are normal. Pt exhibits no distension and no mass. There is no tenderness. There is no rebound, no guarding and no CVA tenderness.   Neurological: pt is alert and oriented to person, place, and time.   Skin: Skin is warm and dry.   Psychiatric: behavior is normal.   Nursing note and vitals reviewed.           Lab Results   Component Value Date/Time    POCCOLOR Yellow 01/27/2022 09:35 AM    POCAPPEAR Cloudy 01/27/2022 09:35 AM    POCLEUKEST Small 01/27/2022 09:35 AM    POCNITRITE NEG 01/27/2022 09:35 AM    POCUROBILIGE 0.2 01/27/2022 09:35 AM    POCPROTEIN 100 01/27/2022 09:35 AM    POCURPH 7.5 01/27/2022 09:35 AM    POCBLOOD Large 01/27/2022 09:35 AM    POCSPGRV 1.020 01/27/2022 09:35 AM    POCKETONES NEG 01/27/2022 09:35 AM    POCBILIRUBIN NEG 01/27/2022 09:35 AM    POCGLUCUA NEG 01/27/2022 09:35 AM            Assessment/Plan:     1. Acute cystitis without hematuria   UA c/w infection      - URINE CULTURE(NEW); Future  - POCT Pregnancy  - nitrofurantoin (MACROBID) 100 MG Cap; Take 1 Capsule by mouth 2 times a day for 5 days.  Dispense: 10 Capsule; Refill: 0    2. Vaginal discharge    - VAGINAL PATHOGENS DNA PANEL; Future  - Chlamydia/GC, PCR (Urine); Future      Follow up in one week if no improvement, sooner if symptoms worsen.

## 2022-07-23 LAB
C TRACH DNA SPEC QL NAA+PROBE: NEGATIVE
CANDIDA DNA VAG QL PROBE+SIG AMP: NEGATIVE
G VAGINALIS DNA VAG QL PROBE+SIG AMP: NEGATIVE
N GONORRHOEA DNA SPEC QL NAA+PROBE: NEGATIVE
SPECIMEN SOURCE: NORMAL
T VAGINALIS DNA VAG QL PROBE+SIG AMP: NEGATIVE

## 2023-10-03 ENCOUNTER — HOSPITAL ENCOUNTER (OUTPATIENT)
Facility: MEDICAL CENTER | Age: 33
End: 2023-10-03
Attending: PHYSICIAN ASSISTANT

## 2023-10-03 ENCOUNTER — OFFICE VISIT (OUTPATIENT)
Dept: URGENT CARE | Facility: PHYSICIAN GROUP | Age: 33
End: 2023-10-03

## 2023-10-03 VITALS
HEIGHT: 64 IN | HEART RATE: 84 BPM | BODY MASS INDEX: 24.75 KG/M2 | OXYGEN SATURATION: 100 % | DIASTOLIC BLOOD PRESSURE: 58 MMHG | RESPIRATION RATE: 16 BRPM | SYSTOLIC BLOOD PRESSURE: 108 MMHG | TEMPERATURE: 97.3 F | WEIGHT: 145 LBS

## 2023-10-03 DIAGNOSIS — N89.8 VAGINAL DISCHARGE: ICD-10-CM

## 2023-10-03 DIAGNOSIS — N76.0 BACTERIAL VAGINOSIS: ICD-10-CM

## 2023-10-03 DIAGNOSIS — B96.89 BACTERIAL VAGINOSIS: ICD-10-CM

## 2023-10-03 LAB
AMBIGUOUS DTTM AMBI4: NORMAL
APPEARANCE UR: CLEAR
BILIRUB UR STRIP-MCNC: NEGATIVE MG/DL
CANDIDA DNA VAG QL PROBE+SIG AMP: NEGATIVE
COLOR UR AUTO: YELLOW
G VAGINALIS DNA VAG QL PROBE+SIG AMP: NEGATIVE
GLUCOSE UR STRIP.AUTO-MCNC: NEGATIVE MG/DL
KETONES UR STRIP.AUTO-MCNC: NEGATIVE MG/DL
LEUKOCYTE ESTERASE UR QL STRIP.AUTO: NEGATIVE
NITRITE UR QL STRIP.AUTO: NEGATIVE
PH UR STRIP.AUTO: 7.5 [PH] (ref 5–8)
POCT INT CON NEG: NEGATIVE
POCT INT CON POS: POSITIVE
POCT URINE PREGNANCY TEST: NEGATIVE
PROT UR QL STRIP: NEGATIVE MG/DL
RBC UR QL AUTO: NEGATIVE
SP GR UR STRIP.AUTO: 1.02
T VAGINALIS DNA VAG QL PROBE+SIG AMP: NEGATIVE
UROBILINOGEN UR STRIP-MCNC: 0.2 MG/DL

## 2023-10-03 PROCEDURE — 87510 GARDNER VAG DNA DIR PROBE: CPT

## 2023-10-03 PROCEDURE — 81002 URINALYSIS NONAUTO W/O SCOPE: CPT | Performed by: PHYSICIAN ASSISTANT

## 2023-10-03 PROCEDURE — 87480 CANDIDA DNA DIR PROBE: CPT

## 2023-10-03 PROCEDURE — 3074F SYST BP LT 130 MM HG: CPT | Performed by: PHYSICIAN ASSISTANT

## 2023-10-03 PROCEDURE — 87591 N.GONORRHOEAE DNA AMP PROB: CPT

## 2023-10-03 PROCEDURE — 99213 OFFICE O/P EST LOW 20 MIN: CPT | Performed by: PHYSICIAN ASSISTANT

## 2023-10-03 PROCEDURE — 3078F DIAST BP <80 MM HG: CPT | Performed by: PHYSICIAN ASSISTANT

## 2023-10-03 PROCEDURE — 87660 TRICHOMONAS VAGIN DIR PROBE: CPT

## 2023-10-03 PROCEDURE — 87491 CHLMYD TRACH DNA AMP PROBE: CPT

## 2023-10-03 PROCEDURE — 81025 URINE PREGNANCY TEST: CPT | Performed by: PHYSICIAN ASSISTANT

## 2023-10-03 RX ORDER — METRONIDAZOLE 500 MG/1
500 TABLET ORAL 2 TIMES DAILY
Qty: 14 TABLET | Refills: 0 | Status: SHIPPED | OUTPATIENT
Start: 2023-10-03 | End: 2023-10-10

## 2023-10-03 RX ORDER — ACYCLOVIR 400 MG/1
TABLET ORAL
COMMUNITY
Start: 2023-08-14

## 2023-10-03 ASSESSMENT — ENCOUNTER SYMPTOMS
FEVER: 0
VOMITING: 0
VAGINITIS: 1
ABDOMINAL PAIN: 0
EYE DISCHARGE: 0
NAUSEA: 0

## 2023-10-03 NOTE — PROGRESS NOTES
Subjective     Josefina REGAN is a 33 y.o. female who presents with Vaginal Discharge ([id number], Itching)          This is a new problem.  The patient presents to clinic complaining of ongoing vaginal discharge x1 month.  The patient initially thought her symptoms were related to a yeast infection, but states her symptoms did not improve with over-the-counter antifungals.  The patient states she then tried over-the-counter boric acid suppositories.  The patient states her symptoms were improving, but then ultimately returned.    Vaginitis  The patient's primary symptoms include genital itching and vaginal discharge. The patient's pertinent negatives include no genital lesions or vaginal bleeding. This is a new problem. Episode onset: x 1 month. The problem occurs constantly. The problem has been unchanged. Pertinent negatives include no abdominal pain, dysuria, fever, hematuria, nausea or vomiting. The vaginal discharge was yellow and green. There has been no bleeding. She has tried antifungals (and OTC boric acid suppositories) for the symptoms.     The patient reports no known exposure to sexually transmitted diseases, but would like to be tested for chlamydia and gonorrhea at this time.    PMH:  has a past medical history of NEGATIVE HISTORY OF and Urinary tract infection, site not specified (2008).  MEDS:   Current Outpatient Medications:     therapeutic multivitamin-minerals (THERAGRAN-M) Tab, Take 1 Tab by mouth every day., Disp: , Rfl:     acyclovir (ZOVIRAX) 400 MG tablet, TAKE 1 TABLET BY MOUTH TWICE DAILY FOR 14 DAYS (Patient not taking: Reported on 10/3/2023), Disp: , Rfl:   ALLERGIES:   Allergies   Allergen Reactions    Sulfa Drugs      SURGHX:   Past Surgical History:   Procedure Laterality Date    MAMMOPLASTY AUGMENTATION      SINUSOTOMIES       SOCHX:  reports that she has never smoked. She has never used smokeless tobacco. She reports that she does not currently use alcohol. She reports that  "she does not use drugs.  FH: Family history was reviewed, no pertinent findings to report      Review of Systems   Constitutional:  Negative for fever.   Eyes:  Negative for discharge.   Gastrointestinal:  Negative for abdominal pain, nausea and vomiting.   Genitourinary:  Positive for vaginal discharge. Negative for dysuria and hematuria.              Objective     /58 (BP Location: Left arm, Patient Position: Sitting, BP Cuff Size: Adult)   Pulse 84   Temp 36.3 °C (97.3 °F) (Temporal)   Resp 16   Ht 1.626 m (5' 4\")   Wt 65.8 kg (145 lb)   LMP 09/09/2023   SpO2 100%   BMI 24.89 kg/m²      Physical Exam  Constitutional:       General: She is not in acute distress.     Appearance: Normal appearance. She is well-developed. She is not ill-appearing.   HENT:      Head: Normocephalic and atraumatic.      Right Ear: External ear normal.      Left Ear: External ear normal.   Eyes:      Extraocular Movements: Extraocular movements intact.      Conjunctiva/sclera: Conjunctivae normal.   Cardiovascular:      Rate and Rhythm: Normal rate and regular rhythm.      Heart sounds: Normal heart sounds.   Pulmonary:      Effort: Pulmonary effort is normal. No respiratory distress.      Breath sounds: Normal breath sounds. No wheezing.   Abdominal:      Palpations: Abdomen is soft.      Tenderness: There is no abdominal tenderness.   Genitourinary:     Comments: Exam deferred.  Musculoskeletal:         General: Normal range of motion.      Cervical back: Normal range of motion and neck supple.   Skin:     General: Skin is warm and dry.   Neurological:      Mental Status: She is alert and oriented to person, place, and time.               Progress:  Results for orders placed or performed in visit on 10/03/23   POCT Urinalysis   Result Value Ref Range    POC Color YELLOW Negative    POC Appearance CLEAR Negative    POC Glucose NEGATIVE Negative mg/dL    POC Bilirubin NEGATIVE Negative mg/dL    POC Ketones NEGATIVE Negative " mg/dL    POC Specific Gravity 1.020 <1.005 - >1.030    POC Blood NEGATIVE Negative    POC Urine PH 7.5 5.0 - 8.0    POC Protein NEGATIVE Negative mg/dL    POC Urobiligen 0.2 Negative (0.2) mg/dL    POC Nitrites NEGATIVE Negative    POC Leukocyte Esterase NEGATIVE Negative   POCT Pregnancy   Result Value Ref Range    POC Urine Pregnancy Test Negative     Internal Control Positive Positive     Internal Control Negative Negative          Vaginal Pathogens - pending     Chlamydia/Gonorrhea - pending                 Assessment & Plan          1. Vaginal discharge  - POCT Urinalysis  - POCT Pregnancy  - VAGINAL PATHOGENS DNA PANEL; Future  - Chlamydia/GC, PCR (Genital/Anal swab); Future    2. Bacterial vaginosis  - metroNIDAZOLE (FLAGYL) 500 MG Tab; Take 1 Tablet by mouth 2 times a day for 7 days.  Dispense: 14 Tablet; Refill: 0    The patient's presenting symptoms and physical exam findings are consistent with vaginal discharge.  The patient's general physical exam today in clinic was normal.  The patient's  exam was deferred.  The patient is nontoxic and appears in no acute distress.  The patient's vital signs are stable and within normal limits.  She is afebrile today in clinic.  The patient's POCT urinalysis today in clinic showed no signs of infection with negative leukocyte esterase, negative blood, and negative nitrates.  The patient's POCT pregnancy test was also negative.  We will test the patient for the vaginal pathogens and chlamydia/gonorrhea at this time to further evaluate her current symptoms.  Based on the patient's presenting symptoms, will prescribe the patient metronidazole for presumed bacterial vaginosis.  Advised the patient to monitor worsening signs or symptoms.  Recommend OTC medications and supportive care for symptomatic management.  Recommend patient follow-up with PCP and/or OB/GYN as needed.  Discussed return precautions with the patient, and she verbalized understanding.    Differential  diagnoses, supportive care, and indications for immediate follow-up discussed with patient.   Instructed to return to clinic or nearest emergency department for any change in condition, further concerns, or worsening of symptoms.    OTC Tylenol or Motrin for fever/discomfort.  Drink plenty of fluids  Follow-up with PCP  Return to clinic or go to the ED if symptoms worsen or fail to improve, or if the patient should develop worsening/increasing vaginal discharge, genital itching, abnormal vaginal bleeding, genital lesions/rashes, urinary symptoms, hematuria, flank pain, abdominal pain, nausea/vomiting, fever/chills, and/or any concerning symptoms.    Discussed plan with the patient, and she agrees to the above.     I personally reviewed prior external notes and test results pertinent to today's visit.  I have independently reviewed and interpreted all diagnostics ordered during this urgent care visit.     Please note that this dictation was created using voice recognition software. I have made every reasonable attempt to correct obvious errors, but I expect that there may be errors of grammar and possibly content that I did not discover before finalizing the note.     This note was electronically signed by Violette Jara PA-C

## 2023-10-04 LAB
C TRACH DNA GENITAL QL NAA+PROBE: NEGATIVE
N GONORRHOEA DNA GENITAL QL NAA+PROBE: NEGATIVE
SPECIMEN SOURCE: NORMAL

## 2025-06-10 SDOH — ECONOMIC STABILITY: INCOME INSECURITY: IN THE LAST 12 MONTHS, WAS THERE A TIME WHEN YOU WERE NOT ABLE TO PAY THE MORTGAGE OR RENT ON TIME?: NO

## 2025-06-10 SDOH — HEALTH STABILITY: PHYSICAL HEALTH: ON AVERAGE, HOW MANY DAYS PER WEEK DO YOU ENGAGE IN MODERATE TO STRENUOUS EXERCISE (LIKE A BRISK WALK)?: 3 DAYS

## 2025-06-10 SDOH — ECONOMIC STABILITY: FOOD INSECURITY: WITHIN THE PAST 12 MONTHS, YOU WORRIED THAT YOUR FOOD WOULD RUN OUT BEFORE YOU GOT MONEY TO BUY MORE.: NEVER TRUE

## 2025-06-10 SDOH — ECONOMIC STABILITY: INCOME INSECURITY: HOW HARD IS IT FOR YOU TO PAY FOR THE VERY BASICS LIKE FOOD, HOUSING, MEDICAL CARE, AND HEATING?: NOT VERY HARD

## 2025-06-10 SDOH — HEALTH STABILITY: MENTAL HEALTH
STRESS IS WHEN SOMEONE FEELS TENSE, NERVOUS, ANXIOUS, OR CAN'T SLEEP AT NIGHT BECAUSE THEIR MIND IS TROUBLED. HOW STRESSED ARE YOU?: ONLY A LITTLE

## 2025-06-10 SDOH — ECONOMIC STABILITY: FOOD INSECURITY: WITHIN THE PAST 12 MONTHS, THE FOOD YOU BOUGHT JUST DIDN'T LAST AND YOU DIDN'T HAVE MONEY TO GET MORE.: NEVER TRUE

## 2025-06-10 SDOH — HEALTH STABILITY: PHYSICAL HEALTH: ON AVERAGE, HOW MANY MINUTES DO YOU ENGAGE IN EXERCISE AT THIS LEVEL?: 60 MIN

## 2025-06-10 ASSESSMENT — LIFESTYLE VARIABLES
HOW MANY STANDARD DRINKS CONTAINING ALCOHOL DO YOU HAVE ON A TYPICAL DAY: PATIENT DOES NOT DRINK
HOW OFTEN DO YOU HAVE A DRINK CONTAINING ALCOHOL: NEVER
SKIP TO QUESTIONS 9-10: 1
HOW OFTEN DO YOU HAVE SIX OR MORE DRINKS ON ONE OCCASION: NEVER
AUDIT-C TOTAL SCORE: 0

## 2025-06-10 ASSESSMENT — SOCIAL DETERMINANTS OF HEALTH (SDOH)
WITHIN THE PAST 12 MONTHS, YOU WORRIED THAT YOUR FOOD WOULD RUN OUT BEFORE YOU GOT THE MONEY TO BUY MORE: NEVER TRUE
HOW OFTEN DO YOU ATTEND CHURCH OR RELIGIOUS SERVICES?: NEVER
IN A TYPICAL WEEK, HOW MANY TIMES DO YOU TALK ON THE PHONE WITH FAMILY, FRIENDS, OR NEIGHBORS?: ONCE A WEEK
HOW OFTEN DO YOU ATTENT MEETINGS OF THE CLUB OR ORGANIZATION YOU BELONG TO?: NEVER
HOW OFTEN DO YOU GET TOGETHER WITH FRIENDS OR RELATIVES?: TWICE A WEEK
HOW OFTEN DO YOU HAVE A DRINK CONTAINING ALCOHOL: NEVER
HOW OFTEN DO YOU ATTEND CHURCH OR RELIGIOUS SERVICES?: NEVER
DO YOU BELONG TO ANY CLUBS OR ORGANIZATIONS SUCH AS CHURCH GROUPS UNIONS, FRATERNAL OR ATHLETIC GROUPS, OR SCHOOL GROUPS?: NO
ARE YOU MARRIED, WIDOWED, DIVORCED, SEPARATED, NEVER MARRIED, OR LIVING WITH A PARTNER?: NEVER MARRIED
IN A TYPICAL WEEK, HOW MANY TIMES DO YOU TALK ON THE PHONE WITH FAMILY, FRIENDS, OR NEIGHBORS?: ONCE A WEEK
DO YOU BELONG TO ANY CLUBS OR ORGANIZATIONS SUCH AS CHURCH GROUPS UNIONS, FRATERNAL OR ATHLETIC GROUPS, OR SCHOOL GROUPS?: NO
HOW OFTEN DO YOU GET TOGETHER WITH FRIENDS OR RELATIVES?: TWICE A WEEK
ARE YOU MARRIED, WIDOWED, DIVORCED, SEPARATED, NEVER MARRIED, OR LIVING WITH A PARTNER?: NEVER MARRIED
HOW OFTEN DO YOU HAVE SIX OR MORE DRINKS ON ONE OCCASION: NEVER
HOW HARD IS IT FOR YOU TO PAY FOR THE VERY BASICS LIKE FOOD, HOUSING, MEDICAL CARE, AND HEATING?: NOT VERY HARD
IN THE PAST 12 MONTHS, HAS THE ELECTRIC, GAS, OIL, OR WATER COMPANY THREATENED TO SHUT OFF SERVICE IN YOUR HOME?: NO
HOW OFTEN DO YOU ATTENT MEETINGS OF THE CLUB OR ORGANIZATION YOU BELONG TO?: NEVER
HOW MANY DRINKS CONTAINING ALCOHOL DO YOU HAVE ON A TYPICAL DAY WHEN YOU ARE DRINKING: PATIENT DOES NOT DRINK

## 2025-06-11 ENCOUNTER — OFFICE VISIT (OUTPATIENT)
Dept: MEDICAL GROUP | Facility: MEDICAL CENTER | Age: 35
End: 2025-06-11
Attending: FAMILY MEDICINE
Payer: MEDICAID

## 2025-06-11 VITALS
OXYGEN SATURATION: 98 % | SYSTOLIC BLOOD PRESSURE: 102 MMHG | HEART RATE: 63 BPM | TEMPERATURE: 98.6 F | DIASTOLIC BLOOD PRESSURE: 70 MMHG | BODY MASS INDEX: 28.34 KG/M2 | WEIGHT: 166 LBS | HEIGHT: 64 IN

## 2025-06-11 DIAGNOSIS — Z11.3 SCREEN FOR STD (SEXUALLY TRANSMITTED DISEASE): ICD-10-CM

## 2025-06-11 DIAGNOSIS — Z30.09 COUNSELING FOR BIRTH CONTROL, INTRAUTERINE DEVICE: ICD-10-CM

## 2025-06-11 DIAGNOSIS — Z13.0 SCREENING FOR ENDOCRINE, NUTRITIONAL, METABOLIC AND IMMUNITY DISORDER: ICD-10-CM

## 2025-06-11 DIAGNOSIS — Z13.29 SCREENING FOR ENDOCRINE, NUTRITIONAL, METABOLIC AND IMMUNITY DISORDER: ICD-10-CM

## 2025-06-11 DIAGNOSIS — Z13.21 SCREENING FOR ENDOCRINE, NUTRITIONAL, METABOLIC AND IMMUNITY DISORDER: ICD-10-CM

## 2025-06-11 DIAGNOSIS — Z13.228 SCREENING FOR ENDOCRINE, NUTRITIONAL, METABOLIC AND IMMUNITY DISORDER: ICD-10-CM

## 2025-06-11 DIAGNOSIS — Z76.89 ENCOUNTER TO ESTABLISH CARE WITH NEW DOCTOR: ICD-10-CM

## 2025-06-11 DIAGNOSIS — H91.93 DECREASED HEARING OF BOTH EARS: ICD-10-CM

## 2025-06-11 PROCEDURE — 3078F DIAST BP <80 MM HG: CPT | Performed by: FAMILY MEDICINE

## 2025-06-11 PROCEDURE — 3074F SYST BP LT 130 MM HG: CPT | Performed by: FAMILY MEDICINE

## 2025-06-11 PROCEDURE — 99214 OFFICE O/P EST MOD 30 MIN: CPT | Performed by: FAMILY MEDICINE

## 2025-06-11 ASSESSMENT — PATIENT HEALTH QUESTIONNAIRE - PHQ9: CLINICAL INTERPRETATION OF PHQ2 SCORE: 0

## 2025-06-11 NOTE — PROGRESS NOTES
Verbal consent was acquired by the patient to use [employer] ambient listening note generation during this visit.    Subjective:     CC:    Chief Complaint   Patient presents with    Establish Care     Referral to ob/gyn needs to replace IUD, hearing concern       HISTORY OF THE PRESENT ILLNESS: Josefina Noriega is a 35 y.o. female. This pleasant patient is here today to establish primary care with me and discuss the following issues.     Specialists   None    History of Present Illness  The patient presents to establish care and for hearing impairment.    Hearing Impairment  She reports a sensation of muffled hearing in both ears, with the left ear being more affected than the right. This issue has been ongoing for approximately 6 to 7 months. She is uncertain if this is related to an eardrum condition or cerumen impaction. She has no history of exposure to loud noises such as concerts, speakers, or shooting ranges. She mentions difficulty understanding speech, often requiring others to repeat themselves. She has no history of seasonal or environmental allergies.  - Onset: Approximately 6 to 7 months ago.  - Location: Both ears, with the left ear being more affected.  - Duration: Ongoing for 6 to 7 months.  - Character: Muffled hearing sensation.  - Aggravating Factors: Difficulty understanding speech, often requiring repetition.    Gynecological Referral  She is seeking a referral to a gynecologist for the replacement of her copper intrauterine device, which was inserted 10 years ago by Dr. Bette Pereyra. She is uncertain about her preference for a new IUD or a different contraceptive method. She is also due for a Pap smear, with the last one being conducted in 10/2020.    She has no known chronic medical conditions. Her surgical history includes sinus surgery and breast augmentation. She has had 3 pregnancies, all resulting in vaginal births. She is currently in a monogamous relationship and is  employed as an . She is not on any prescribed medications but takes supplements including probiotics, omega fish oils, zinc, vitamin C, magnesium, and [place] liver, kidney, and heart pills. She has a known allergy to sulfa drugs, which caused a rash during her pregnancy. She was diagnosed with gonorrhea 8 years ago.    PAST SURGICAL HISTORY:  - Sinus surgery  - Breast augmentation    SOCIAL HISTORY  She has no regular use of tobacco or drugs and consumes alcohol very rarely, about once a year. She has 2 daughters from previous relationship and they are healthy. She lives with her daughters and has a pet dog named Shaila.    FAMILY HISTORY  Her mother has been diagnosed with diabetes, high blood pressure, and cervical cancer. Father  (homicide); no relevant medical history available he was  and had no known health problems. Her maternal grandfather had a history of alcohol abuse. Her grandmothers are healthy and do not consume alcohol. No other family history of cancer.      Allergies: Sulfa drugs      [facility] [address] [place] [address] - [address] [id number] [facility] [address]  Phone: [phone] Fax: [phone]      Current Medications[1]    Past Medical History[2]    Past Surgical History[3]    Family History   Problem Relation Age of Onset    Diabetes Mother     Hypertension Mother     Cervical Cancer Mother     No Known Problems Father     Alcohol abuse Maternal Grandfather        Social History[4]  Social Drivers of Health     Alcohol Use: Not At Risk (6/10/2025)    AUDIT-C     Frequency of Alcohol Consumption: Never     Average Number of Drinks: Patient does not drink     Frequency of Binge Drinking: Never   Depression: Not at risk (2025)    PHQ-2     PHQ-2 Score: 0   Financial Resource Strain: Low Risk  (6/10/2025)    Overall Financial Resource Strain (CARDIA)     Difficulty of Paying Living Expenses: Not very hard   Food Insecurity: No Food  "Insecurity (6/10/2025)    Hunger Vital Sign     Worried About Running Out of Food in the Last Year: Never true     Ran Out of Food in the Last Year: Never true   Housing Stability: Low Risk  (6/10/2025)    Housing Stability Vital Sign     Unable to Pay for Housing in the Last Year: No     Number of Times Moved in the Last Year: 0     Homeless in the Last Year: No   Intimate Partner Violence: Not on file   Physical Activity: Sufficiently Active (6/10/2025)    Exercise Vital Sign     Days of Exercise per Week: 3 days     Minutes of Exercise per Session: 60 min   Social Connections: Socially Isolated (6/10/2025)    Social Connection and Isolation Panel [NHANES]     Frequency of Communication with Friends and Family: Once a week     Frequency of Social Gatherings with Friends and Family: Twice a week     Attends Druze Services: Never     Active Member of Clubs or Organizations: No     Attends Club or Organization Meetings: Never     Marital Status: Never    Stress: No Stress Concern Present (6/10/2025)    [employer] - Occupational Stress Questionnaire     Feeling of Stress : Only a little   Tobacco Use: Low Risk  (6/11/2025)    Patient History     Smoking Tobacco Use: Never     Smokeless Tobacco Use: Never     Passive Exposure: Not on file   Transportation Needs: No Transportation Needs (6/10/2025)    PRAPARE - Transportation     Lack of Transportation (Medical): No     Lack of Transportation (Non-Medical): No   Utilities: Not At Risk (6/10/2025)    Utilities     Threatened with loss of utilities: No        Objective:     /70 (BP Location: Right arm, Patient Position: Sitting, BP Cuff Size: Adult)   Pulse 63   Temp 37 °C (98.6 °F) (Temporal)   Ht 1.626 m (5' 4\")   Wt 75.3 kg (166 lb)   SpO2 98%   BMI 28.49 kg/m²   Physical Exam  Constitutional: Alert, no distress  Skin: No rashes in visible areas  Eye: Conjunctiva clear, lids normal  Respiratory: Unlabored respiratory " effort on room air, no cough, lungs clear to auscultation bilaterally  CV: RRR  MSK: Normal gait, moves all extremities  Psych: Alert and oriented x3, normal affect and mood      Assessment & Plan:   35 y.o. female with the following -    1. Encounter to establish care with new doctor        2. Decreased hearing of both ears  Referral to Audiology      3. Counseling for birth control, intrauterine device  Referral to Gynecology      4. Screen for STD (sexually transmitted disease)  HIV AG/AB COMBO ASSAY SCREENING    HEP C VIRUS ANTIBODY    Comp Metabolic Panel    CBC WITH DIFFERENTIAL    Chlamydia/GC, PCR (Genital/Anal swab)    T.Pallidum AB JENNI (Screening)    Trichomonas Vaginalis by TMA    HEP B Surface Antibody    Hep B Core AB Total    Hep B Surface Antigen      5. Screening for endocrine, nutritional, metabolic and immunity disorder  HEMOGLOBIN A1C    Lipid Profile        Assessment & Plan  1. Hearing impairment: Normal.  - Sensation of muffled hearing potentially attributed to fluid accumulation behind the ear, possibly due to mucus from allergies.  - Discussed the use of a nasal spray like Flonase to manage fluid buildup.  - Referral for a comprehensive audiological evaluation will be provided.    2. Health maintenance.  - Last Pap smear conducted in 10/2020 with normal results.  - Copper intrauterine device in place since 2014.  - Referral to a gynecologist for counseling on birth control options and potential IUD replacement.  - Laboratory tests ordered: complete blood count, complete metabolic panel, liver function tests, kidney function tests, diabetes screening, cholesterol panel, and STD testing (HIV, hepatitis C, syphilis, gonorrhea, chlamydia). Advised to fast for at least 8 hours prior to testing and maintain adequate hydration.  - Declined genetic testing through the [facility].    Follow-up  - Annual well visit recommended within the next 6 months.    A comprehensive discussion  regarding lifestyle medicine was conducted with the patient, emphasizing the importance of maintaining a healthy plant based diet, engaging in regular physical activity, maintaining healthy social relationships, managing stress, ensuring adequate restful sleep, and avoiding risky substances such as alcohol, drugs, and tobacco.     Return for routine annual wellness exam.    A total of 35 minutes of time was spent on day of encounter reviewing medical record, performing history and examination, counseling, ordering medication/test/consults and documentation.    Please be advised that this document was generated using voice recognition software. While I have made reasonable efforts to correct any obvious errors, there may still be grammatical or content inaccuracies that were not identified or corrected prior to finalization.       [1]   Current Outpatient Medications   Medication Sig Dispense Refill    Probiotic Product (PROBIOTIC BLEND PO) Take  by mouth.      magnesium citrate Solution Take 300 mL by mouth one time.      Omega 3-6-9 Fatty Acids (OMEGA 3-6-9 PO) Take  by mouth.      ZINC + VITAMIN C MT Use  in the mouth or throat.      Nutritional Supplements (ADULT NUTRITIONAL SUPPLEMENT + PO) Take  by mouth. Paleovalley Grass Fed Organ Complex       No current facility-administered medications for this visit.   [2]   Past Medical History:  Diagnosis Date    NEGATIVE HISTORY OF     Urinary tract infection, site not specified 2008   [3]   Past Surgical History:  Procedure Laterality Date    MAMMOPLASTY AUGMENTATION      SINUSOTOMIES     [4]   Social History  Tobacco Use    Smoking status: Never    Smokeless tobacco: Never   Vaping Use    Vaping status: Never Used   Substance Use Topics    Alcohol use: Not Currently     Comment: rare    Drug use: No

## 2025-06-19 NOTE — Clinical Note
REFERRAL APPROVAL NOTICE         Sent on June 18, 2025                   Josefina Noriega  [address] [facility] [address]                   Dear Ms. Noriega,    After a careful review of the medical information and benefit coverage, [facility] has processed your referral. See below for additional details.    If applicable, you must be actively enrolled with your insurance for coverage of the authorized service. If you have any questions regarding your coverage, please contact your insurance directly.    REFERRAL INFORMATION   Referral #:  [id number]  Referred-To Department    Referred-By Provider:  Gynecology    Italia Ferrera M.D.   [facility]      [address]  [facility] [address]  [phone] [address] [facility] [address]  [facility] [id number] [phone]    Referral Start Date:  06/11/2025  Referral End Date:   06/11/2026             SCHEDULING  If you do not already have an appointment, please call [phone] to make an appointment.     MORE INFORMATION  If you do not already have a [employer] account, sign up at: [email or web address]  You can access your medical information, make appointments, see lab results, billing information, and more.  If you have questions regarding this referral, please contact  the [facility] Referrals department at:             [phone]. Monday - Friday 8:00AM - 5:00PM.     Sincerely,    [facility]

## 2025-06-19 NOTE — Clinical Note
REFERRAL APPROVAL NOTICE         Sent on June 18, 2025                   Josefina Noriega  [address] [place] [address]                   Dear Ms. Noriega,    After a careful review of the medical information and benefit coverage, [facility] has processed your referral. See below for additional details.    If applicable, you must be actively enrolled with your insurance for coverage of the authorized service. If you have any questions regarding your coverage, please contact your insurance directly.    REFERRAL INFORMATION   Referral #:  [id number]  Referred-To Department    Referred-By Provider:  N/A    Italia Ferrera M.D.   [facility]      [address] [place] [address]  [phone] [address] [facility] [id number] [phone]    Referral Start Date:  06/11/2025  Referral End Date:   06/11/2026             SCHEDULING  If you do not already have an appointment, please call [phone] to make an appointment.     MORE INFORMATION  If you do not already have a [employer] account, sign up at: [email or web address]  You can access your medical information, make appointments, see lab results, billing information, and more.  If you have questions regarding this referral, please contact  the [facility] Referrals department at:             [phone]. Monday - Friday 8:00AM - 5:00PM.     Sincerely,    [facility]

## 2025-06-27 ENCOUNTER — HOSPITAL ENCOUNTER (OUTPATIENT)
Facility: MEDICAL CENTER | Age: 35
End: 2025-06-27
Attending: FAMILY MEDICINE
Payer: MEDICAID

## 2025-06-27 DIAGNOSIS — Z11.3 SCREEN FOR STD (SEXUALLY TRANSMITTED DISEASE): ICD-10-CM

## 2025-06-27 LAB
ALBUMIN SERPL BCP-MCNC: 4.1 G/DL (ref 3.2–4.9)
ALBUMIN/GLOB SERPL: 1.6 G/DL
ALP SERPL-CCNC: 59 U/L (ref 30–99)
ALT SERPL-CCNC: 21 U/L (ref 2–50)
ANION GAP SERPL CALC-SCNC: 11 MMOL/L (ref 7–16)
AST SERPL-CCNC: 23 U/L (ref 12–45)
BASOPHILS # BLD AUTO: 0.5 % (ref 0–1.8)
BASOPHILS # BLD: 0.03 K/UL (ref 0–0.12)
BILIRUB SERPL-MCNC: 0.4 MG/DL (ref 0.1–1.5)
BUN SERPL-MCNC: 13 MG/DL (ref 8–22)
CALCIUM ALBUM COR SERPL-MCNC: 8.7 MG/DL (ref 8.5–10.5)
CALCIUM SERPL-MCNC: 8.8 MG/DL (ref 8.5–10.5)
CHLORIDE SERPL-SCNC: 105 MMOL/L (ref 96–112)
CO2 SERPL-SCNC: 22 MMOL/L (ref 20–33)
CREAT SERPL-MCNC: 0.76 MG/DL (ref 0.5–1.4)
EOSINOPHIL # BLD AUTO: 0.13 K/UL (ref 0–0.51)
EOSINOPHIL NFR BLD: 2.2 % (ref 0–6.9)
ERYTHROCYTE [DISTWIDTH] IN BLOOD BY AUTOMATED COUNT: 45.1 FL (ref 35.9–50)
GFR SERPLBLD CREATININE-BSD FMLA CKD-EPI: 104 ML/MIN/1.73 M 2
GLOBULIN SER CALC-MCNC: 2.6 G/DL (ref 1.9–3.5)
GLUCOSE SERPL-MCNC: 84 MG/DL (ref 65–99)
HBV CORE AB SERPL QL IA: NONREACTIVE
HBV SURFACE AB SERPL IA-ACNC: <3.5 MIU/ML (ref 0–10)
HBV SURFACE AG SER QL: NORMAL
HCT VFR BLD AUTO: 40.2 % (ref 37–47)
HCV AB SER QL: NORMAL
HGB BLD-MCNC: 13.1 G/DL (ref 12–16)
IMM GRANULOCYTES # BLD AUTO: 0.01 K/UL (ref 0–0.11)
IMM GRANULOCYTES NFR BLD AUTO: 0.2 % (ref 0–0.9)
LYMPHOCYTES # BLD AUTO: 2.28 K/UL (ref 1–4.8)
LYMPHOCYTES NFR BLD: 39.1 % (ref 22–41)
MCH RBC QN AUTO: 31.9 PG (ref 27–33)
MCHC RBC AUTO-ENTMCNC: 32.6 G/DL (ref 32.2–35.5)
MCV RBC AUTO: 97.8 FL (ref 81.4–97.8)
MONOCYTES # BLD AUTO: 0.35 K/UL (ref 0–0.85)
MONOCYTES NFR BLD AUTO: 6 % (ref 0–13.4)
NEUTROPHILS # BLD AUTO: 3.03 K/UL (ref 1.82–7.42)
NEUTROPHILS NFR BLD: 52 % (ref 44–72)
NRBC # BLD AUTO: 0 K/UL
NRBC BLD-RTO: 0 /100 WBC (ref 0–0.2)
PLATELET # BLD AUTO: 223 K/UL (ref 164–446)
PMV BLD AUTO: 12.5 FL (ref 9–12.9)
POTASSIUM SERPL-SCNC: 4.1 MMOL/L (ref 3.6–5.5)
PROT SERPL-MCNC: 6.7 G/DL (ref 6–8.2)
RBC # BLD AUTO: 4.11 M/UL (ref 4.2–5.4)
SODIUM SERPL-SCNC: 138 MMOL/L (ref 135–145)
T PALLIDUM AB SER QL IA: NORMAL
WBC # BLD AUTO: 5.8 K/UL (ref 4.8–10.8)

## 2025-06-27 PROCEDURE — 80053 COMPREHEN METABOLIC PANEL: CPT

## 2025-06-27 PROCEDURE — 87340 HEPATITIS B SURFACE AG IA: CPT

## 2025-06-27 PROCEDURE — 85025 COMPLETE CBC W/AUTO DIFF WBC: CPT

## 2025-06-27 PROCEDURE — 86706 HEP B SURFACE ANTIBODY: CPT

## 2025-06-27 PROCEDURE — 86780 TREPONEMA PALLIDUM: CPT

## 2025-06-27 PROCEDURE — 86803 HEPATITIS C AB TEST: CPT

## 2025-06-27 PROCEDURE — 36415 COLL VENOUS BLD VENIPUNCTURE: CPT

## 2025-06-27 PROCEDURE — 86704 HEP B CORE ANTIBODY TOTAL: CPT

## 2025-06-30 ENCOUNTER — RESULTS FOLLOW-UP (OUTPATIENT)
Dept: MEDICAL GROUP | Facility: MEDICAL CENTER | Age: 35
End: 2025-06-30
Payer: MEDICAID

## 2025-08-07 ENCOUNTER — APPOINTMENT (OUTPATIENT)
Dept: OBGYN | Facility: CLINIC | Age: 35
End: 2025-08-07
Attending: FAMILY MEDICINE
Payer: MEDICAID

## 2025-08-20 ENCOUNTER — GYNECOLOGY VISIT (OUTPATIENT)
Dept: OBGYN | Facility: CLINIC | Age: 35
End: 2025-08-20
Payer: MEDICAID

## 2025-08-20 ENCOUNTER — HOSPITAL ENCOUNTER (OUTPATIENT)
Facility: MEDICAL CENTER | Age: 35
End: 2025-08-20
Payer: MEDICAID

## 2025-08-20 VITALS
BODY MASS INDEX: 28.13 KG/M2 | DIASTOLIC BLOOD PRESSURE: 72 MMHG | HEIGHT: 64 IN | SYSTOLIC BLOOD PRESSURE: 110 MMHG | WEIGHT: 164.8 LBS

## 2025-08-20 DIAGNOSIS — Z01.419 ENCOUNTER FOR GYNECOLOGICAL EXAMINATION WITHOUT ABNORMAL FINDING: Primary | ICD-10-CM

## 2025-08-20 DIAGNOSIS — Z30.432 ENCOUNTER FOR REMOVAL OF INTRAUTERINE CONTRACEPTIVE DEVICE (IUD): ICD-10-CM

## 2025-08-20 PROCEDURE — 87510 GARDNER VAG DNA DIR PROBE: CPT

## 2025-08-20 PROCEDURE — 87480 CANDIDA DNA DIR PROBE: CPT

## 2025-08-20 PROCEDURE — 87660 TRICHOMONAS VAGIN DIR PROBE: CPT

## 2025-08-20 RX ORDER — ACETAMINOPHEN AND CODEINE PHOSPHATE 120; 12 MG/5ML; MG/5ML
1 SOLUTION ORAL DAILY
Qty: 84 TABLET | Refills: 11 | Status: SHIPPED | OUTPATIENT
Start: 2025-08-20

## 2025-08-20 ASSESSMENT — FIBROSIS 4 INDEX: FIB4 SCORE: 0.81

## 2025-08-21 DIAGNOSIS — Z01.419 ENCOUNTER FOR GYNECOLOGICAL EXAMINATION WITHOUT ABNORMAL FINDING: ICD-10-CM

## 2025-08-21 LAB
CANDIDA DNA VAG QL PROBE+SIG AMP: NEGATIVE
G VAGINALIS DNA VAG QL PROBE+SIG AMP: NEGATIVE
T VAGINALIS DNA VAG QL PROBE+SIG AMP: NEGATIVE

## 2025-09-02 ENCOUNTER — APPOINTMENT (OUTPATIENT)
Dept: AUDIOLOGY | Facility: OTHER | Age: 35
End: 2025-09-02
Payer: MEDICAID